# Patient Record
Sex: FEMALE | Race: WHITE | NOT HISPANIC OR LATINO | URBAN - METROPOLITAN AREA
[De-identification: names, ages, dates, MRNs, and addresses within clinical notes are randomized per-mention and may not be internally consistent; named-entity substitution may affect disease eponyms.]

---

## 2017-06-17 ENCOUNTER — APPOINTMENT (OUTPATIENT)
Dept: LAB | Facility: HOSPITAL | Age: 32
End: 2017-06-17
Payer: COMMERCIAL

## 2017-06-17 ENCOUNTER — OFFICE VISIT (OUTPATIENT)
Dept: URGENT CARE | Facility: CLINIC | Age: 32
End: 2017-06-17
Payer: COMMERCIAL

## 2017-06-17 DIAGNOSIS — N94.89 OTHER SPECIFIED CONDITIONS ASSOCIATED WITH FEMALE GENITAL ORGANS AND MENSTRUAL CYCLE: ICD-10-CM

## 2017-06-17 DIAGNOSIS — R30.9 PAINFUL MICTURITION: ICD-10-CM

## 2017-06-17 DIAGNOSIS — R10.2 PELVIC AND PERINEAL PAIN: ICD-10-CM

## 2017-06-17 DIAGNOSIS — N92.6 IRREGULAR MENSTRUATION: ICD-10-CM

## 2017-06-17 PROCEDURE — G0382 LEV 3 HOSP TYPE B ED VISIT: HCPCS

## 2017-06-17 PROCEDURE — 87086 URINE CULTURE/COLONY COUNT: CPT

## 2017-06-17 PROCEDURE — S9083 URGENT CARE CENTER GLOBAL: HCPCS

## 2017-06-19 LAB — BACTERIA UR CULT: NORMAL

## 2017-06-29 ENCOUNTER — TRANSCRIBE ORDERS (OUTPATIENT)
Dept: ADMINISTRATIVE | Facility: HOSPITAL | Age: 32
End: 2017-06-29

## 2017-06-29 ENCOUNTER — APPOINTMENT (OUTPATIENT)
Dept: LAB | Facility: HOSPITAL | Age: 32
End: 2017-06-29
Payer: COMMERCIAL

## 2017-06-29 ENCOUNTER — ALLSCRIPTS OFFICE VISIT (OUTPATIENT)
Dept: OTHER | Facility: OTHER | Age: 32
End: 2017-06-29

## 2017-06-29 ENCOUNTER — HOSPITAL ENCOUNTER (OUTPATIENT)
Dept: ULTRASOUND IMAGING | Facility: HOSPITAL | Age: 32
Discharge: HOME | End: 2017-06-29
Payer: COMMERCIAL

## 2017-06-29 DIAGNOSIS — R10.2 PELVIC AND PERINEAL PAIN: ICD-10-CM

## 2017-06-29 DIAGNOSIS — N94.89 OTHER SPECIFIED CONDITIONS ASSOCIATED WITH FEMALE GENITAL ORGANS AND MENSTRUAL CYCLE: ICD-10-CM

## 2017-06-29 DIAGNOSIS — N92.6 IRREGULAR MENSTRUATION: ICD-10-CM

## 2017-06-29 LAB
ALBUMIN SERPL BCP-MCNC: 4.1 G/DL (ref 3.5–5)
ALP SERPL-CCNC: 75 U/L (ref 46–116)
ALT SERPL W P-5'-P-CCNC: 24 U/L (ref 12–78)
ANION GAP SERPL CALCULATED.3IONS-SCNC: 9 MMOL/L (ref 4–13)
AST SERPL W P-5'-P-CCNC: 15 U/L (ref 5–45)
BASOPHILS # BLD AUTO: 0.04 THOUSANDS/ΜL (ref 0–0.1)
BASOPHILS NFR BLD AUTO: 1 % (ref 0–1)
BILIRUB SERPL-MCNC: 0.7 MG/DL (ref 0.2–1)
BUN SERPL-MCNC: 14 MG/DL (ref 5–25)
CALCIUM SERPL-MCNC: 9.5 MG/DL (ref 8.3–10.1)
CHLORIDE SERPL-SCNC: 104 MMOL/L (ref 100–108)
CO2 SERPL-SCNC: 28 MMOL/L (ref 21–32)
CREAT SERPL-MCNC: 0.89 MG/DL (ref 0.6–1.3)
EOSINOPHIL # BLD AUTO: 0.33 THOUSAND/ΜL (ref 0–0.61)
EOSINOPHIL NFR BLD AUTO: 4 % (ref 0–6)
ERYTHROCYTE [DISTWIDTH] IN BLOOD BY AUTOMATED COUNT: 12.4 % (ref 11.6–15.1)
GFR SERPL CREATININE-BSD FRML MDRD: >60 ML/MIN/1.73SQ M
GLUCOSE SERPL-MCNC: 87 MG/DL (ref 65–140)
HCG SERPL QL: NEGATIVE
HCT VFR BLD AUTO: 42.3 % (ref 34.8–46.1)
HGB BLD-MCNC: 14.3 G/DL (ref 11.5–15.4)
LYMPHOCYTES # BLD AUTO: 2.05 THOUSANDS/ΜL (ref 0.6–4.47)
LYMPHOCYTES NFR BLD AUTO: 26 % (ref 14–44)
MCH RBC QN AUTO: 31 PG (ref 26.8–34.3)
MCHC RBC AUTO-ENTMCNC: 33.8 G/DL (ref 31.4–37.4)
MCV RBC AUTO: 92 FL (ref 82–98)
MONOCYTES # BLD AUTO: 0.47 THOUSAND/ΜL (ref 0.17–1.22)
MONOCYTES NFR BLD AUTO: 6 % (ref 4–12)
NEUTROPHILS # BLD AUTO: 5.06 THOUSANDS/ΜL (ref 1.85–7.62)
NEUTS SEG NFR BLD AUTO: 63 % (ref 43–75)
PLATELET # BLD AUTO: 195 THOUSANDS/UL (ref 149–390)
PMV BLD AUTO: 11.4 FL (ref 8.9–12.7)
POTASSIUM SERPL-SCNC: 3.7 MMOL/L (ref 3.5–5.3)
PROT SERPL-MCNC: 8.1 G/DL (ref 6.4–8.2)
RBC # BLD AUTO: 4.62 MILLION/UL (ref 3.81–5.12)
SODIUM SERPL-SCNC: 141 MMOL/L (ref 136–145)
WBC # BLD AUTO: 7.95 THOUSAND/UL (ref 4.31–10.16)

## 2017-06-29 PROCEDURE — 76830 TRANSVAGINAL US NON-OB: CPT

## 2017-06-29 PROCEDURE — 80053 COMPREHEN METABOLIC PANEL: CPT

## 2017-06-29 PROCEDURE — 85025 COMPLETE CBC W/AUTO DIFF WBC: CPT

## 2017-06-29 PROCEDURE — 76856 US EXAM PELVIC COMPLETE: CPT

## 2017-06-29 PROCEDURE — 84703 CHORIONIC GONADOTROPIN ASSAY: CPT

## 2017-06-29 PROCEDURE — 36415 COLL VENOUS BLD VENIPUNCTURE: CPT

## 2017-07-01 ENCOUNTER — GENERIC CONVERSION - ENCOUNTER (OUTPATIENT)
Dept: OTHER | Facility: OTHER | Age: 32
End: 2017-07-01

## 2017-09-01 ENCOUNTER — GENERIC CONVERSION - ENCOUNTER (OUTPATIENT)
Dept: OTHER | Facility: OTHER | Age: 32
End: 2017-09-01

## 2017-09-28 ENCOUNTER — GENERIC CONVERSION - ENCOUNTER (OUTPATIENT)
Dept: OTHER | Facility: OTHER | Age: 32
End: 2017-09-28

## 2017-10-26 ENCOUNTER — GENERIC CONVERSION - ENCOUNTER (OUTPATIENT)
Dept: OTHER | Facility: OTHER | Age: 32
End: 2017-10-26

## 2017-11-21 ENCOUNTER — GENERIC CONVERSION - ENCOUNTER (OUTPATIENT)
Dept: OTHER | Facility: OTHER | Age: 32
End: 2017-11-21

## 2017-12-20 ENCOUNTER — GENERIC CONVERSION - ENCOUNTER (OUTPATIENT)
Dept: FAMILY MEDICINE CLINIC | Facility: CLINIC | Age: 32
End: 2017-12-20

## 2018-01-10 NOTE — MISCELLANEOUS
Message  I spoke with the patient and reviewed the results of her testing  Her ultrasound demonstrated some mild free fluid in the pelvis  Her lab work was all normal including her pregnancy test which was negative  I advised her it started likely some of her pain could be related to ruptured ovarian cyst  I advised her to follow-up with her gynecologist as scheduled  She will call with any worsening pain, fever, or change in symptoms  Results/Data     * US PELVIS COMPLETE (TRANSABDOMINAL AND TRANSVAGINAL)   US PELVIS COMPLETE (TRANSABDOMINAL AND TRANSVAGINAL): PELVIC  ULTRASOUND, COMPLETE     INDICATION: Pelvic and perineal pain  COMPARISON: None  TECHNIQUE:   Transabdominal pelvic ultrasound was performed in sagittal and  transverse planes with a curvilinear transducer  Additional transvaginal imaging was  performed to better evaluate the endometrium and ovaries  Imaging included  volumetric    sweeps as well as traditional still imaging technique  FINDINGS:     UTERUS:   The uterus is retroverted in position, measuring 8 2 x 5 3 x 5 3 cm  Contour and echotexture appear normal        The cervix shows no suspicious abnormality  ENDOMETRIUM:     Normal caliber of 9 mm  Homogenous and normal in appearance  OVARIES/ADNEXA:   Right ovary:  4 1 x 2 0 x 1 9 cm  No suspicious right ovarian abnormality  Doppler flow within normal limits  Left ovary:  3 3 x 1 4 x 1 2 cm  No suspicious left ovarian abnormality  Doppler flow within normal limits  No suspicious adnexal mass or loculated collections  Trace pelvic free fluid  IMPRESSION:       Trace pelvic free fluid  Otherwise, unremarkable pelvic ultrasound             Workstation performed: [id number]     Signed by:   Meenakshi 6, DO   6/29/17    (1) CBC/PLT/DIFF   WBC COUNT: 7 95 Thousand/uL Reference Range 4 31-10 16  RBC COUNT: 4 62 Million/uL Reference Range 3 81-5 12  HEMOGLOBIN: 14 3 g/dL Reference Range 11 5-15 4  HEMATOCRIT: 42 3 % Reference Range 34 8-46  1  MCV: 92 fL Reference Range 82-98  MCH: 31 0 pg Reference Range 26 8-34 3  MCHC: 33 8 g/dL Reference Range 31 4-37 4  RDW: 12 4 % Reference Range 11 6-15 1  MPV: 11 4 fL Reference Range 8 9-12 7  PLATELET COUNT: 555 Thousands/uL Reference Range 149-390  NEUTROPHILS RELATIVE PERCENT: 63 % Reference Range 43-75  LYMPHOCYTES RELATIVE PERCENT: 26 % Reference Range 14-44  MONOCYTES RELATIVE PERCENT: 6 % Reference Range 4-12  EOSINOPHILS RELATIVE PERCENT: 4 % Reference Range 0-6  BASOPHILS RELATIVE PERCENT: 1 % Reference Range 0-1  NEUTROPHILS ABSOLUTE COUNT: 5 06 Thousands/? ??L Reference Range 1 85-7 62  LYMPHOCYTES ABSOLUTE COUNT: 2 05 Thousands/? ??L Reference Range 0 60-4 47  MONOCYTES ABSOLUTE COUNT: 0 47 Thousand/? ??L Reference Range 0 17-1 22  EOSINOPHILS ABSOLUTE COUNT: 0 33 Thousand/? ??L Reference Range 0 00-0 61  BASOPHILS ABSOLUTE COUNT: 0 04 Thousands/? ??L Reference Range 0 00-0 10  (1) COMPREHENSIVE METABOLIC PANEL   SODIUM: 612 mmol/L Reference Range 136-145  POTASSIUM: 3 7 mmol/L Reference Range 3 5-5 3  CHLORIDE: 104 mmol/L Reference Range 100-108  CARBON DIOXIDE: 28 mmol/L Reference Range 21-32  ANION GAP (CALC): 9 mmol/L Reference Range 4-13  BLOOD UREA NITROGEN: 14 mg/dL Reference Range 5-25  CREATININE: 0 89 mg/dL Reference Range 0 60-1 30  GLUCOSE,RANDM: 87 mg/dL Reference Range   CALCIUM: 9 5 mg/dL Reference Range 8 3-10 1  AST(SGOT): 15 U/L Reference Range 5-45  ALT (SGPT): 24 U/L Reference Range 12-78  ALK PHOSPHATAS: 75 U/L Reference Range   TOTAL PROTEIN: 8 1 g/dL Reference Range 6 4-8 2  ALBUMIN: 4 1 g/dL Reference Range 3 5-5 0  BILI, TOTAL: 0 70 mg/dL Reference Range 0 20-1 00  eGFR Non-: >60 0 ml/min/1 73sq m  PREGNANCY TEST   PREGNANCY TEST: Negative  Reference Range Negative    Signatures   Electronically signed by : Mark Maria DO; Jul 1 2017 11:02AM EST (Author)

## 2018-01-13 VITALS
BODY MASS INDEX: 29.26 KG/M2 | WEIGHT: 159 LBS | SYSTOLIC BLOOD PRESSURE: 110 MMHG | HEART RATE: 71 BPM | DIASTOLIC BLOOD PRESSURE: 70 MMHG | RESPIRATION RATE: 17 BRPM | TEMPERATURE: 99.1 F | HEIGHT: 62 IN

## 2018-01-15 ENCOUNTER — GENERIC CONVERSION - ENCOUNTER (OUTPATIENT)
Dept: FAMILY MEDICINE CLINIC | Facility: CLINIC | Age: 33
End: 2018-01-15

## 2018-01-16 NOTE — MISCELLANEOUS
Provider Comments  Provider Comments:   [id number] NO SHOW 6 MONTH CK LETTER SENT VF      Signatures   Electronically signed by : Teddy Rangel DO; Mar  1 2016  8:21PM EST                       (Author)

## 2019-03-21 ENCOUNTER — OFFICE VISIT (OUTPATIENT)
Dept: FAMILY MEDICINE CLINIC | Facility: CLINIC | Age: 34
End: 2019-03-21
Payer: COMMERCIAL

## 2019-03-21 VITALS
OXYGEN SATURATION: 98 % | WEIGHT: 167.6 LBS | BODY MASS INDEX: 30.84 KG/M2 | HEIGHT: 62 IN | SYSTOLIC BLOOD PRESSURE: 108 MMHG | DIASTOLIC BLOOD PRESSURE: 86 MMHG | TEMPERATURE: 97 F | HEART RATE: 104 BPM

## 2019-03-21 DIAGNOSIS — L50.9 URTICARIAL RASH: Primary | ICD-10-CM

## 2019-03-21 PROCEDURE — 99213 OFFICE O/P EST LOW 20 MIN: CPT | Performed by: FAMILY MEDICINE

## 2019-03-21 PROCEDURE — 3008F BODY MASS INDEX DOCD: CPT | Performed by: FAMILY MEDICINE

## 2019-03-21 PROCEDURE — 1036F TOBACCO NON-USER: CPT | Performed by: FAMILY MEDICINE

## 2019-03-21 RX ORDER — METHYLPREDNISOLONE 4 MG/1
TABLET ORAL
Qty: 21 TABLET | Refills: 0 | Status: SHIPPED | OUTPATIENT
Start: 2019-03-21 | End: 2019-10-24 | Stop reason: ALTCHOICE

## 2019-03-21 NOTE — ASSESSMENT & PLAN NOTE
Patient is breastfeeding  Advised safe to take OTC allergy medication  We discussed the risk of taking benadryl or hydroxyzine but this is not recommended given she is breastfeeding  If symptoms don't totally resolve she will call back and we can send in extended course of prednisone

## 2019-03-21 NOTE — PROGRESS NOTES
Sj Guo 1985 female MRN: [id number]    Family Medicine Acute Visit    ASSESSMENT/PLAN  Problem List Items Addressed This Visit        Musculoskeletal and Integument    Urticarial rash - Primary     Patient is breastfeeding  Advised safe to take OTC allergy medication  We discussed the risk of taking benadryl or hydroxyzine but this is not recommended given she is breastfeeding  If symptoms don't totally resolve she will call back and we can send in extended course of prednisone         Relevant Medications    methylPREDNISolone 4 MG tablet therapy pack          RTC if worse or not improved  ED precautions discussed with patient       No future appointments  SUBJECTIVE  CC: Rash (forearms L/R )      HPI:  Sj Guo is a 26 y o  female who presents for rash  Started with rash last night, started on wrist and now is spreading  It is very itchy  Last year when pregnancy had PUPS and needed prednisone  No new products that she can think of  No household contacts have this  No trouble breathing, wheezing or throat tightness  Review of Systems   Constitutional: Negative for chills, fatigue and fever  HENT: Negative for congestion, postnasal drip, rhinorrhea and sinus pressure  Eyes: Negative for photophobia and visual disturbance  Respiratory: Negative for cough and shortness of breath  Cardiovascular: Negative for chest pain, palpitations and leg swelling  Gastrointestinal: Negative for abdominal pain, constipation, diarrhea, nausea and vomiting  Genitourinary: Negative for difficulty urinating and dysuria  Musculoskeletal: Negative for arthralgias and myalgias  Skin: Positive for rash  Negative for color change  Neurological: Negative for dizziness, weakness, light-headedness and headaches  Historical Information   The patient history was reviewed as follows:  History reviewed  No pertinent past medical history  History reviewed   No pertinent surgical history  History reviewed  No pertinent family history  Social History   Social History     Substance and Sexual Activity   Alcohol Use Never    Frequency: Never     Social History     Substance and Sexual Activity   Drug Use Never     Social History     Tobacco Use   Smoking Status Never Smoker   Smokeless Tobacco Never Used       Medications:     Current Outpatient Medications:     methylPREDNISolone 4 MG tablet therapy pack, Use as directed on package, Disp: 21 tablet, Rfl: 0    No Known Allergies    OBJECTIVE  Vitals:   Vitals:    03/21/19 1437   BP: 108/86   BP Location: Left arm   Patient Position: Sitting   Cuff Size: Standard   Pulse: 104   Temp: (!) 97 °F (36 1 °C)   TempSrc: Tympanic   SpO2: 98%   Weight: 76 kg (167 lb 9 6 oz)   Height: 5' 2" (1 575 m)         Physical Exam   Constitutional: She is oriented to person, place, and time  She appears well-developed and well-nourished  HENT:   Head: Normocephalic and atraumatic  Mouth/Throat: Oropharynx is clear and moist    Eyes: Pupils are equal, round, and reactive to light  Neck: Normal range of motion  Neck supple  Cardiovascular: Normal rate, regular rhythm and normal heart sounds  Pulmonary/Chest: Effort normal and breath sounds normal  No respiratory distress  She has no wheezes  Abdominal: Soft  Bowel sounds are normal  She exhibits no distension  There is no tenderness  Musculoskeletal: Normal range of motion  She exhibits no edema or tenderness  Neurological: She is alert and oriented to person, place, and time  Skin: Skin is warm and dry  Rash noted  Rash is urticarial    Urticarial rash present on b/l arms and wrists    Psychiatric: She has a normal mood and affect   Her behavior is normal                   Antoine Gongora, DO    3/21/2019

## 2019-10-24 ENCOUNTER — OFFICE VISIT (OUTPATIENT)
Dept: FAMILY MEDICINE CLINIC | Facility: CLINIC | Age: 34
End: 2019-10-24
Payer: COMMERCIAL

## 2019-10-24 VITALS
DIASTOLIC BLOOD PRESSURE: 80 MMHG | TEMPERATURE: 98.5 F | BODY MASS INDEX: 30.73 KG/M2 | HEIGHT: 62 IN | WEIGHT: 167 LBS | RESPIRATION RATE: 12 BRPM | HEART RATE: 84 BPM | SYSTOLIC BLOOD PRESSURE: 124 MMHG

## 2019-10-24 DIAGNOSIS — M77.9 TENDONITIS: Primary | ICD-10-CM

## 2019-10-24 PROCEDURE — 99213 OFFICE O/P EST LOW 20 MIN: CPT | Performed by: NURSE PRACTITIONER

## 2019-10-24 PROCEDURE — 3008F BODY MASS INDEX DOCD: CPT | Performed by: NURSE PRACTITIONER

## 2019-10-24 RX ORDER — PREDNISONE 10 MG/1
10 TABLET ORAL DAILY
Qty: 24 TABLET | Refills: 0 | Status: SHIPPED | OUTPATIENT
Start: 2019-10-24 | End: 2020-02-05 | Stop reason: ALTCHOICE

## 2019-10-24 NOTE — PATIENT INSTRUCTIONS
Tendinitis   WHAT YOU NEED TO KNOW:   Tendinitis is painful inflammation or breakdown of your tendons  It may also be called tendinopathy  Tendinitis often occurs in the knee, shoulder, ankle, hip, or elbow  DISCHARGE INSTRUCTIONS:   Medicines:   · Pain medicines  such as acetaminophen or NSAIDs may decrease swelling and pain or fever  These medicines are available without a doctor's order  Ask which medicine to take  Ask how much to take and when to take it  Follow directions  Acetaminophen and NSAIDs can cause liver or kidney damage if not taken correctly  If you take blood thinner medicine, always ask your healthcare provider if NSAIDs are safe for you  Always read the medicine label and follow the directions on it before using these medicine  · Take your medicine as directed  Contact your healthcare provider if you think your medicine is not helping or if you have side effects  Tell him if you are allergic to any medicine  Keep a list of the medicines, vitamins, and herbs you take  Include the amounts, and when and why you take them  Bring the list or the pill bottles to follow-up visits  Carry your medicine list with you in case of an emergency  Management:   · Rest  your tendon as directed to help it heal  Ask your healthcare provider if you need to stop putting weight on your affected area  · Ice  helps decrease swelling and pain  Ice may also help prevent tissue damage  Use an ice pack, or put crushed ice in a plastic bag  Cover it with a towel and place it on the affected area for 10 to 15 minutes every hour or as directed  · Support devices  such as a cane, splint, shoe insert, or brace may help reduce your pain  · Physical therapy  may be ordered by your healthcare provider  This may be used to teach you exercises to help improve movement and strength, and to decrease pain  You may also learn how to improve your posture, and how to lift or exercise correctly    Prevention:   · Stretch and warm up  before you exercise  · Exercise regularly  to strengthen the muscles around your joint  Ease into an exercise routine for the first 3 weeks to prevent another injury  Ask your healthcare provider about the best exercise plan for you  Rest fully between activities  · Use the right equipment  for sports and exercise  Wear braces or tape around weak joints as directed  Follow up with your healthcare provider as directed:  Write down your questions so you remember to ask them during your visits  Contact your healthcare provider if:   · You have increased pain even after you take medicine  · You have questions or concerns about your condition or care  Return to the emergency department if:   · You have increased redness over the joint, or swelling in the joint  · You suddenly cannot move your joint  © 2017 [employer]  Information is for End User's use only and may not be sold, redistributed or otherwise used for commercial purposes  All illustrations and images included in CareNotes® are the copyrighted property of [employer]  or [employer]  The above information is an  only  It is not intended as medical advice for individual conditions or treatments  Talk to your doctor, nurse or pharmacist before following any medical regimen to see if it is safe and effective for you

## 2019-10-24 NOTE — PROGRESS NOTES
Assessment/Plan   Diagnoses and all orders for this visit:    Tendonitis  -     predniSONE 10 mg tablet; Take 1 tablet (10 mg total) by mouth daily 5 tabs 10/24 & 10/25, 4 tabs 10/26 & 10/27, 3 tabs 10/28, 2 tabs 10/29, 1 tab 10/30  -     Ambulatory referral to Orthopedic Surgery; Future        Chief Complaint   Patient presents with    Wrist Pain     woke up with it 2-3 days ago       Subjective   Patient ID: Marialuisa Alex is a 37 y o  female  Vitals:    10/24/19 1506   BP: 124/80   Pulse: 84   Resp: 12   Temp: 98 5 °F (36 9 °C)     Right hand    Hand Pain    The incident occurred 3 to 5 days ago (woke with pain in right wrist 3 days ago, , repetive motion with keyboard, right hand dominant)  The incident occurred at home  There was no injury mechanism  The pain is present in the right wrist  The quality of the pain is described as aching and burning  The pain does not radiate  The pain is at a severity of 5/10  The pain is moderate  The pain has been constant since the incident  Pertinent negatives include no chest pain, muscle weakness, numbness or tingling  The symptoms are aggravated by movement  She has tried NSAIDs for the symptoms  The treatment provided no relief  The following portions of the patient's history were reviewed and updated as appropriate: allergies, current medications, past family history, past social history, past surgical history and problem list     Review of Systems   Constitutional: Negative  HENT: Negative  Eyes: Negative  Respiratory: Negative  Cardiovascular: Negative  Negative for chest pain  Gastrointestinal: Negative  Endocrine: Negative  Genitourinary: Negative  Musculoskeletal: Positive for arthralgias  Skin: Negative  Allergic/Immunologic: Negative  Neurological: Negative  Negative for tingling and numbness  Hematological: Negative  Psychiatric/Behavioral: Negative          Objective     Physical Exam Constitutional: She is oriented to person, place, and time  She appears well-developed and well-nourished  No distress  HENT:   Head: Normocephalic and atraumatic  Eyes: Conjunctivae are normal  Right eye exhibits no discharge  Left eye exhibits no discharge  No scleral icterus  Neck: Normal range of motion  Neck supple  No thyromegaly present  Cardiovascular: Normal rate, regular rhythm and normal heart sounds  No murmur heard  Pulmonary/Chest: Effort normal and breath sounds normal  No respiratory distress  She has no wheezes  Abdominal: Soft  Bowel sounds are normal    Musculoskeletal: Normal range of motion  She exhibits tenderness  She exhibits no edema  Right wrist: She exhibits tenderness  She exhibits normal range of motion, no effusion, no crepitus and no deformity  Arms:  Lymphadenopathy:     She has no cervical adenopathy  Neurological: She is alert and oriented to person, place, and time  Skin: Skin is warm and dry  Capillary refill takes less than 2 seconds  She is not diaphoretic  Psychiatric: She has a normal mood and affect  Her behavior is normal  Judgment and thought content normal    Nursing note and vitals reviewed    No Known Allergies  Patient Active Problem List   Diagnosis    Urticarial rash       Current Outpatient Medications:     predniSONE 10 mg tablet, Take 1 tablet (10 mg total) by mouth daily 5 tabs 10/24 & 10/25, 4 tabs 10/26 & 10/27, 3 tabs 10/28, 2 tabs 10/29, 1 tab 10/30, Disp: 24 tablet, Rfl: 0  Social History     Socioeconomic History    Marital status: /Civil Union     Spouse name: Not on file    Number of children: Not on file    Years of education: Not on file    Highest education level: Not on file   Occupational History    Not on file   Social Needs    Financial resource strain: Not on file    Food insecurity:     Worry: Not on file     Inability: Not on file    Transportation needs:     Medical: Not on file     Non-medical: Not on file   Tobacco Use    Smoking status: Never Smoker    Smokeless tobacco: Never Used   Substance and Sexual Activity    Alcohol use: Never     Frequency: Never    Drug use: Never    Sexual activity: Not Currently     Partners: Male     Birth control/protection: None   Lifestyle    Physical activity:     Days per week: Not on file     Minutes per session: Not on file    Stress: Not on file   Relationships    Social connections:     Talks on phone: Not on file     Gets together: Not on file     Attends Judaism service: Not on file     Active member of club or organization: Not on file     Attends meetings of clubs or organizations: Not on file     Relationship status: Not on file    Intimate partner violence:     Fear of current or ex partner: Not on file     Emotionally abused: Not on file     Physically abused: Not on file     Forced sexual activity: Not on file   Other Topics Concern    Not on file   Social History Narrative    Not on file     No family history on file

## 2020-02-05 ENCOUNTER — OFFICE VISIT (OUTPATIENT)
Dept: URGENT CARE | Facility: CLINIC | Age: 35
End: 2020-02-05
Payer: COMMERCIAL

## 2020-02-05 VITALS
WEIGHT: 173 LBS | SYSTOLIC BLOOD PRESSURE: 110 MMHG | RESPIRATION RATE: 18 BRPM | TEMPERATURE: 98.7 F | DIASTOLIC BLOOD PRESSURE: 72 MMHG | HEIGHT: 63 IN | HEART RATE: 103 BPM | BODY MASS INDEX: 30.65 KG/M2 | OXYGEN SATURATION: 98 %

## 2020-02-05 DIAGNOSIS — J11.1 INFLUENZA-LIKE ILLNESS: Primary | ICD-10-CM

## 2020-02-05 LAB — S PYO AG THROAT QL: NEGATIVE

## 2020-02-05 PROCEDURE — 87880 STREP A ASSAY W/OPTIC: CPT | Performed by: PHYSICIAN ASSISTANT

## 2020-02-05 PROCEDURE — 99213 OFFICE O/P EST LOW 20 MIN: CPT | Performed by: PHYSICIAN ASSISTANT

## 2020-02-05 NOTE — PROGRESS NOTES
[facility]        NAME: Leyla Prado is a 37 y o  female  : 1985    MRN: [id number]  DATE: 2020  TIME: 1:48 PM    Assessment and Plan   Influenza-like illness [R69]  1  Influenza-like illness  POCT rapid strepA         Patient Instructions     Encourage lots of rest and fluids  Continue w/ OTC symptom relief including tylenol, cough medicine, etc   Discussed etiology is most likely viral, possible flu  Follow up with PCP in 3-5 days  Proceed to  ER if symptoms worsen  Chief Complaint     Chief Complaint   Patient presents with    Sore Throat     Pt c/o body aches, lethargy and sore throat x 3 days  Denies fevers  History of Present Illness       Patient presents with complaint of sore throat x 3 days  Pt reports associated cough, intermittent congestion & rhinorrhea, fatigue, body aches, nausea, and myalgias  She denies fever, chills, night sweats, chest pain, dyspnea, headache, vomiting, and diarrhea  Pt reports several recent sick contacts including her children  Pt states that one of her daughters was diagnosed with strep pharyngitis this AM  She reports taking a cold & flu medication last night but denies trying other palliative measures  She reports getting a flu shot this year  Review of Systems   Review of Systems   Constitutional: Positive for fatigue  Negative for chills and fever  HENT: Positive for rhinorrhea and sore throat  Respiratory: Positive for cough  Negative for chest tightness, shortness of breath and wheezing  Cardiovascular: Negative for chest pain and palpitations  Gastrointestinal: Positive for nausea  Musculoskeletal: Positive for myalgias  Skin: Negative for color change, rash and wound  Neurological: Negative for headaches  All other systems reviewed and are negative  Current Medications     No current outpatient medications on file      Current Allergies     Allergies as of 2020    (No Known Allergies) The following portions of the patient's history were reviewed and updated as appropriate: allergies, current medications, past family history, past medical history, past social history, past surgical history and problem list      Past Medical History:   Diagnosis Date    Known health problems: none        Past Surgical History:   Procedure Laterality Date    ADENOIDECTOMY      TONSILLECTOMY         Family History   Problem Relation Age of Onset    Diabetes Mother     No Known Problems Father          Medications have been verified  Objective   /72   Pulse 103   Temp 98 7 °F (37 1 °C)   Resp 18   Ht 5' 3" (1 6 m)   Wt 78 5 kg (173 lb)   SpO2 98%   BMI 30 65 kg/m²        Physical Exam     Physical Exam   Constitutional: She is oriented to person, place, and time  She appears well-developed and well-nourished  Non-toxic appearance  She does not appear ill  No distress  HENT:   Head: Normocephalic and atraumatic  Right Ear: Tympanic membrane and ear canal normal  No drainage or swelling  Left Ear: Tympanic membrane and ear canal normal  No drainage or swelling  Mouth/Throat: Mucous membranes are normal  No oropharyngeal exudate, posterior oropharyngeal edema or posterior oropharyngeal erythema  Tonsils are 0 on the right  Tonsils are 0 on the left  No tonsillar exudate  Eyes: Pupils are equal, round, and reactive to light  Conjunctivae and EOM are normal    Neck: Normal range of motion  Neck supple  Cardiovascular: Normal rate, regular rhythm and normal heart sounds  Pulmonary/Chest: Effort normal and breath sounds normal  No stridor  No respiratory distress  She has no wheezes  She has no rhonchi  She has no rales  Lymphadenopathy:     She has no cervical adenopathy  Neurological: She is alert and oriented to person, place, and time  No cranial nerve deficit or sensory deficit  Skin: Skin is warm and dry  Capillary refill takes less than 2 seconds  No rash noted  She is not diaphoretic  Psychiatric: She has a normal mood and affect  Her behavior is normal  Thought content normal    Nursing note and vitals reviewed

## 2020-02-12 ENCOUNTER — OFFICE VISIT (OUTPATIENT)
Dept: URGENT CARE | Facility: CLINIC | Age: 35
End: 2020-02-12
Payer: COMMERCIAL

## 2020-02-12 VITALS
WEIGHT: 173 LBS | HEIGHT: 62 IN | BODY MASS INDEX: 31.83 KG/M2 | TEMPERATURE: 97.4 F | HEART RATE: 99 BPM | SYSTOLIC BLOOD PRESSURE: 134 MMHG | RESPIRATION RATE: 16 BRPM | DIASTOLIC BLOOD PRESSURE: 79 MMHG | OXYGEN SATURATION: 98 %

## 2020-02-12 DIAGNOSIS — H10.9 CONJUNCTIVITIS OF BOTH EYES, UNSPECIFIED CONJUNCTIVITIS TYPE: Primary | ICD-10-CM

## 2020-02-12 PROCEDURE — S9083 URGENT CARE CENTER GLOBAL: HCPCS | Performed by: NURSE PRACTITIONER

## 2020-02-12 PROCEDURE — G0382 LEV 3 HOSP TYPE B ED VISIT: HCPCS | Performed by: NURSE PRACTITIONER

## 2020-02-12 RX ORDER — GENTAMICIN SULFATE 3 MG/ML
1 SOLUTION/ DROPS OPHTHALMIC EVERY 4 HOURS
Qty: 5 ML | Refills: 0 | Status: SHIPPED | OUTPATIENT
Start: 2020-02-12

## 2020-02-12 NOTE — PATIENT INSTRUCTIONS

## 2020-02-12 NOTE — PROGRESS NOTES
[facility]        NAME: Jefferson Osborn is a 37 y o  female  : 1985    MRN: [id number]  DATE: 2020  TIME: 4:17 PM    Assessment and Plan   Conjunctivitis of both eyes, unspecified conjunctivitis type [H10 9]  1  Conjunctivitis of both eyes, unspecified conjunctivitis type  gentamicin (GARAMYCIN) 0 3 % ophthalmic solution         Patient Instructions     Gentamicin drops sent to pharmacy  Follow up with PCP in 3-5 days  Proceed to  ER if symptoms worsen  Chief Complaint     Chief Complaint   Patient presents with    Conjunctivitis     Pt reports yesterday she started with c/o b/l eye redness, itching and drainage  History of Present Illness       HPI   Patient present with bilateral eye redness, itching, and drainage which began this am  She reports that when she woke up this morning her L eye had crusty dry yellow drainage  Both of her daughters have been diagnosed with pink eye  She denies any visual changes or pain  Review of Systems   Review of Systems   Eyes: Positive for discharge, redness and itching  Negative for photophobia, pain and visual disturbance           Current Medications       Current Outpatient Medications:     gentamicin (GARAMYCIN) 0 3 % ophthalmic solution, Administer 1 drop to both eyes every 4 (four) hours 5 days, Disp: 5 mL, Rfl: 0    Current Allergies     Allergies as of 2020    (No Known Allergies)            The following portions of the patient's history were reviewed and updated as appropriate: allergies, current medications, past family history, past medical history, past social history, past surgical history and problem list      Past Medical History:   Diagnosis Date    Known health problems: none        Past Surgical History:   Procedure Laterality Date    ADENOIDECTOMY       SECTION      x2    TONSILLECTOMY         Family History   Problem Relation Age of Onset    Diabetes Mother     No Known Problems Father Medications have been verified  Objective   /79 (BP Location: Left arm, Patient Position: Sitting)   Pulse 99   Temp (!) 97 4 °F (36 3 °C) (Tympanic)   Resp 16   Ht 5' 2" (1 575 m)   Wt 78 5 kg (173 lb)   SpO2 98%   BMI 31 64 kg/m²        Physical Exam     Physical Exam   Eyes: EOM are normal  Right eye exhibits no discharge  Left eye exhibits no discharge  Right conjunctiva is injected  Left conjunctiva is injected

## 2021-03-05 ENCOUNTER — VBI (OUTPATIENT)
Dept: ADMINISTRATIVE | Facility: OTHER | Age: 36
End: 2021-03-05

## 2021-04-01 DIAGNOSIS — Z23 ENCOUNTER FOR IMMUNIZATION: ICD-10-CM

## 2021-04-11 ENCOUNTER — IMMUNIZATIONS (OUTPATIENT)
Dept: FAMILY MEDICINE CLINIC | Facility: HOSPITAL | Age: 36
End: 2021-04-11

## 2021-04-11 DIAGNOSIS — Z23 ENCOUNTER FOR IMMUNIZATION: Primary | ICD-10-CM

## 2021-04-11 PROCEDURE — 0001A SARS-COV-2 / COVID-19 MRNA VACCINE (PFIZER-BIONTECH) 30 MCG: CPT

## 2021-04-11 PROCEDURE — 91300 SARS-COV-2 / COVID-19 MRNA VACCINE (PFIZER-BIONTECH) 30 MCG: CPT

## 2021-05-04 ENCOUNTER — IMMUNIZATIONS (OUTPATIENT)
Dept: FAMILY MEDICINE CLINIC | Facility: HOSPITAL | Age: 36
End: 2021-05-04

## 2021-05-04 DIAGNOSIS — Z23 ENCOUNTER FOR IMMUNIZATION: Primary | ICD-10-CM

## 2021-05-04 PROCEDURE — 91300 SARS-COV-2 / COVID-19 MRNA VACCINE (PFIZER-BIONTECH) 30 MCG: CPT

## 2021-05-04 PROCEDURE — 0002A SARS-COV-2 / COVID-19 MRNA VACCINE (PFIZER-BIONTECH) 30 MCG: CPT

## 2022-12-30 ENCOUNTER — OFFICE VISIT (OUTPATIENT)
Dept: URGENT CARE | Facility: CLINIC | Age: 37
End: 2022-12-30

## 2022-12-30 VITALS
HEART RATE: 97 BPM | BODY MASS INDEX: 29.44 KG/M2 | RESPIRATION RATE: 18 BRPM | WEIGHT: 160 LBS | TEMPERATURE: 98.9 F | HEIGHT: 62 IN | OXYGEN SATURATION: 98 %

## 2022-12-30 DIAGNOSIS — J01.00 ACUTE NON-RECURRENT MAXILLARY SINUSITIS: Primary | ICD-10-CM

## 2022-12-30 RX ORDER — PSEUDOEPHEDRINE HCL 120 MG/1
120 TABLET, FILM COATED, EXTENDED RELEASE ORAL EVERY 12 HOURS
Qty: 14 TABLET | Refills: 0 | Status: SHIPPED | OUTPATIENT
Start: 2022-12-30 | End: 2023-01-06

## 2022-12-30 RX ORDER — FLUTICASONE PROPIONATE 50 MCG
1 SPRAY, SUSPENSION (ML) NASAL DAILY
Qty: 11.1 ML | Refills: 0 | Status: SHIPPED | OUTPATIENT
Start: 2022-12-30

## 2022-12-30 RX ORDER — AZITHROMYCIN 250 MG/1
TABLET, FILM COATED ORAL
Qty: 6 TABLET | Refills: 0 | Status: SHIPPED | OUTPATIENT
Start: 2022-12-30 | End: 2023-01-03

## 2022-12-30 NOTE — PROGRESS NOTES
[facility] Now        NAME: Nile Corey is a 42 y o  female  : 1985    MRN: [id number]  DATE: 2022  TIME: 1:39 PM    Assessment and Plan   Acute non-recurrent maxillary sinusitis [J01 00]  1  Acute non-recurrent maxillary sinusitis  fluticasone (FLONASE) 50 mcg/act nasal spray    pseudoephedrine (SUDAFED) 120 MG 12 hr tablet    azithromycin (ZITHROMAX) 250 mg tablet        Patient presents with c/o ongoing sinus congestion and new left sided maxillary pain  Was diagnosed with COVID on  however started symptoms several days prior  Overall congestion , cough and symptoms have improved however localized area of pain is worsening  Has tried OTC medications and decongestants  Assessment notes left max sinus pain and TTP  BS clear  Patient Instructions       Follow up with PCP as needed  Chief Complaint     Chief Complaint   Patient presents with   • Cold Like Symptoms     Pt reports testing Covid positive on  with symptom onset a couple of days prior to testing  She presents today with sinus pressure, with eye and tooth pain she states symptoms started a couple of days ago  Managing symptoms with sudafed cold and flu, saline spray, and tylenol with some symptom relief  History of Present Illness       Patient presents with c/o ongoing sinus congestion and new left sided maxillary pain  Was diagnosed with COVID on  however started symptoms several days prior  Overall congestion , cough and symptoms have improved however localized area of pain is worsening  Has tried OTC medications and decongestants  Assessment notes left max sinus pain and TTP  BS clear  Review of Systems   Review of Systems   Constitutional: Negative for chills and fever  HENT: Positive for congestion, sinus pressure and sinus pain  Negative for ear pain, postnasal drip and sore throat  Eyes: Negative for pain and itching     Respiratory: Negative for cough, shortness of breath and wheezing  Cardiovascular: Negative for chest pain and palpitations  Gastrointestinal: Negative for abdominal pain, constipation, diarrhea, nausea and vomiting  Genitourinary: Negative for difficulty urinating and hematuria  Musculoskeletal: Negative for arthralgias and myalgias  Skin: Negative for rash  Neurological: Negative for dizziness, light-headedness and headaches  Psychiatric/Behavioral: Negative for agitation and sleep disturbance  The patient is not nervous/anxious  Current Medications       Current Outpatient Medications:   •  azithromycin (ZITHROMAX) 250 mg tablet, Take 2 tablets today then 1 tablet daily x 4 days, Disp: 6 tablet, Rfl: 0  •  fluticasone (FLONASE) 50 mcg/act nasal spray, 1 spray into each nostril daily, Disp: 11 1 mL, Rfl: 0  •  pseudoephedrine (SUDAFED) 120 MG 12 hr tablet, Take 1 tablet (120 mg total) by mouth every 12 (twelve) hours for 7 days, Disp: 14 tablet, Rfl: 0  •  gentamicin (GARAMYCIN) 0 3 % ophthalmic solution, Administer 1 drop to both eyes every 4 (four) hours 5 days (Patient not taking: Reported on 2022), Disp: 5 mL, Rfl: 0    Current Allergies     Allergies as of 2022   • (No Known Allergies)            The following portions of the patient's history were reviewed and updated as appropriate: allergies, current medications, past family history, past medical history, past social history, past surgical history and problem list      Past Medical History:   Diagnosis Date   • Known health problems: none        Past Surgical History:   Procedure Laterality Date   • ADENOIDECTOMY     •  SECTION      x2   • TONSILLECTOMY         Family History   Problem Relation Age of Onset   • Diabetes Mother    • No Known Problems Father          Medications have been verified  Objective   Pulse 97   Temp 98 9 °F (37 2 °C)   Resp 18   Ht 5' 2" (1 575 m)   Wt 72 6 kg (160 lb)   SpO2 98%   BMI 29 26 kg/m²   No LMP recorded  Physical Exam     Physical Exam  Vitals reviewed  Constitutional:       General: She is not in acute distress  Appearance: Normal appearance  She is not ill-appearing  HENT:      Head: Normocephalic and atraumatic  Nose: Congestion present  Left Sinus: Maxillary sinus tenderness present  Eyes:      Extraocular Movements: Extraocular movements intact  Conjunctiva/sclera: Conjunctivae normal    Pulmonary:      Effort: Pulmonary effort is normal    Lymphadenopathy:      Cervical: Cervical adenopathy present  Skin:     General: Skin is warm  Neurological:      General: No focal deficit present  Mental Status: She is alert     Psychiatric:         Mood and Affect: Mood normal          Behavior: Behavior normal          Judgment: Judgment normal

## 2023-01-30 ENCOUNTER — OFFICE VISIT (OUTPATIENT)
Dept: FAMILY MEDICINE CLINIC | Facility: HOSPITAL | Age: 38
End: 2023-01-30

## 2023-01-30 VITALS
BODY MASS INDEX: 31.65 KG/M2 | HEART RATE: 80 BPM | SYSTOLIC BLOOD PRESSURE: 110 MMHG | HEIGHT: 62 IN | WEIGHT: 172 LBS | OXYGEN SATURATION: 98 % | DIASTOLIC BLOOD PRESSURE: 84 MMHG

## 2023-01-30 DIAGNOSIS — F41.1 GAD (GENERALIZED ANXIETY DISORDER): ICD-10-CM

## 2023-01-30 DIAGNOSIS — Z13.0 SCREENING FOR IRON DEFICIENCY ANEMIA: ICD-10-CM

## 2023-01-30 DIAGNOSIS — Z13.1 SCREENING FOR DIABETES MELLITUS: Primary | ICD-10-CM

## 2023-01-30 DIAGNOSIS — Z11.59 NEED FOR HEPATITIS C SCREENING TEST: ICD-10-CM

## 2023-01-30 DIAGNOSIS — Z13.220 SCREENING FOR HYPERLIPIDEMIA: ICD-10-CM

## 2023-01-30 DIAGNOSIS — Z13.29 SCREENING FOR THYROID DISORDER: ICD-10-CM

## 2023-01-30 DIAGNOSIS — R22.42 SKIN LUMP OF LEG, LEFT: ICD-10-CM

## 2023-01-30 DIAGNOSIS — E07.9 SWELLING OF THYROID GLAND: ICD-10-CM

## 2023-01-30 RX ORDER — ESCITALOPRAM OXALATE 10 MG/1
5 TABLET, FILM COATED ORAL DAILY
Qty: 90 TABLET | Refills: 1 | Status: SHIPPED | OUTPATIENT
Start: 2023-01-30

## 2023-01-30 NOTE — PATIENT INSTRUCTIONS
[facility]  [facility] - [address], [facility] [address] Psychology: [phone]    Call insurance for in [facility], [address] or not  Better Help or Therapists  com     Dr Charla Moss

## 2023-01-30 NOTE — PROGRESS NOTES
[facility],     Assessment/Plan:      Diagnosis ICD-10-CM Associated Orders   1  Screening for diabetes mellitus  Z13 1 Comprehensive metabolic panel      2  Screening for iron deficiency anemia  Z13 0 CBC and differential      3  Screening for thyroid disorder  Z13 29 TSH, 3rd generation with Free T4 reflex      4  Need for hepatitis C screening test  Z11 59 Hepatitis C antibody      5  Screening for hyperlipidemia  Z13 220 Lipid panel      6  ABRAHAN (generalized anxiety disorder)  F41 1 Ambulatory Referral to Psychiatry     Lexapro 10 MG tablet      7  Skin lump of leg, left  R22 42 US extremity soft tissue      8  Swelling of thyroid gland  E07 9 US thyroid        • Retry lexapro low dose then call if not getting better  • US x2 as above  Screening & diagnostic bloodwork ordered, our office will reach out with results once obtained  • Referral infor given  Return in about 3 months (around 4/30/2023) for Annual Physical   • Patient may call or return to office with any questions or concerns  ______________________________________________________________________  Subjective:     Patient ID: Desiree Hill is a 42 y o  female  HPI  Desiree Hill  Chief Complaint   Patient presents with   • Physical Exam   • Establish Care     2 months ago noticed left thigh lump  No hx of Abnormal paps  2 daughters through [facility] - Dr Raul Sofia   2 C/S's  No contraception, possible future babies  Swelling on R throat compared to left  C/O thyroid, no family hx  Has noticed weak core since baby #2, worse exercising  On lexapro x2, prior to pregnancies  Stopped for pregnancies  Has done some therapy in the past, not recently  More life stressors  Daughter has emotional/behavioral concerns  BMI Counseling: Body mass index is 31 46 kg/m²  The BMI is above normal  Nutrition recommendations include decreasing portion sizes and encouraging healthy choices of fruits and vegetables  Exercise recommendations include moderate physical activity 150 minutes/week, exercising 3-5 times per week and strength training exercises  Rationale for BMI follow-up plan is due to patient being overweight or obese  Depression Screening and Follow-up Plan: Patient was screened for depression during today's encounter  They screened negative with a PHQ-2 score of 2  No SI or HI      ABRAHAN-7 Flowsheet Screening    Flowsheet Row Most Recent Value   Over the last 2 weeks, how often have you been bothered by any of the following problems? Feeling nervous, anxious, or on edge 2   Not being able to stop or control worrying 2   Worrying too much about different things 2   Trouble relaxing 1   Being so restless that it is hard to sit still 0   Becoming easily annoyed or irritable 2   Feeling afraid as if something awful might happen 0   ABRAHAN-7 Total Score 9        The following portions of the patient's history were reviewed and updated as appropriate: allergies, current medications, past medical history, and problem list     Review of Systems   Constitutional: Negative for chills and fever  Respiratory: Negative for cough and shortness of breath  Cardiovascular: Negative for chest pain and palpitations  Neurological: Negative for dizziness, light-headedness and headaches  Psychiatric/Behavioral: Positive for dysphoric mood and sleep disturbance (due to kids)  Negative for self-injury and suicidal ideas  The patient is nervous/anxious  Objective:      Vitals:    01/30/23 1506   BP: 110/84   Pulse: 80   SpO2: 98%      Physical Exam  Vitals reviewed  Constitutional:       General: She is not in acute distress  Appearance: Normal appearance  She is well-developed  She is not ill-appearing  HENT:      Head: Normocephalic and atraumatic  Eyes:      General: No scleral icterus  Right eye: No discharge  Left eye: No discharge     Neck:      Comments: Soft tissue mass R thyroid larger than left   Cardiovascular:      Rate and Rhythm: Normal rate and regular rhythm  Pulses: Normal pulses  Heart sounds: Normal heart sounds  No murmur heard  Pulmonary:      Effort: Pulmonary effort is normal  No respiratory distress  Breath sounds: Normal breath sounds  No stridor  No wheezing  Musculoskeletal:      Cervical back: Normal range of motion and neck supple  No rigidity or tenderness  Right lower leg: No edema  Left lower leg: No edema  Lymphadenopathy:      Cervical: No cervical adenopathy  Skin:     General: Skin is warm  Findings: Lesion (small pea sized lump on posterior left thigh, just below gluteal fold) present  No erythema or rash  Neurological:      Mental Status: She is alert and oriented to person, place, and time  Gait: Gait normal    Psychiatric:         Behavior: Behavior normal          Thought Content: Thought content normal          Judgment: Judgment normal       Comments: Anxious, borderline tearful, reserved            Portions of the record may have been created with voice recognition software  Occasional wrong word or "sound alike" substitutions may have occurred due to the inherent limitations of voice recognition software  Please review the chart carefully and recognize, using context, where substitutions/typographical errors may have occurred

## 2023-02-06 ENCOUNTER — HOSPITAL ENCOUNTER (OUTPATIENT)
Dept: ULTRASOUND IMAGING | Facility: HOSPITAL | Age: 38
Discharge: HOME | End: 2023-02-06
Attending: STUDENT IN AN ORGANIZED HEALTH CARE EDUCATION/TRAINING PROGRAM

## 2023-02-06 DIAGNOSIS — E07.9 SWELLING OF THYROID GLAND: ICD-10-CM

## 2023-02-06 DIAGNOSIS — R22.42 SKIN LUMP OF LEG, LEFT: ICD-10-CM

## 2023-03-21 ENCOUNTER — RA CDI HCC (OUTPATIENT)
Dept: OTHER | Facility: HOSPITAL | Age: 38
End: 2023-03-21

## 2023-03-21 NOTE — PROGRESS NOTES
[facility]  coding opportunities       Chart reviewed, no opportunity found: CHART REVIEWED, NO OPPORTUNITY FOUND        Patients Insurance        Commercial Insurance: [address] [place] [address]

## 2023-03-29 LAB
ALBUMIN SERPL-MCNC: 4.4 G/DL (ref 3.6–5.1)
ALBUMIN/GLOB SERPL: 1.8 (CALC) (ref 1–2.5)
ALP SERPL-CCNC: 70 U/L (ref 31–125)
ALT SERPL-CCNC: 21 U/L (ref 6–29)
AST SERPL-CCNC: 17 U/L (ref 10–30)
BASOPHILS # BLD AUTO: 41 CELLS/UL (ref 0–200)
BASOPHILS NFR BLD AUTO: 0.7 %
BILIRUB SERPL-MCNC: 1.2 MG/DL (ref 0.2–1.2)
BUN SERPL-MCNC: 11 MG/DL (ref 7–25)
BUN/CREAT SERPL: NORMAL (CALC) (ref 6–22)
CALCIUM SERPL-MCNC: 9.5 MG/DL (ref 8.6–10.2)
CHLORIDE SERPL-SCNC: 102 MMOL/L (ref 98–110)
CHOLEST SERPL-MCNC: 169 MG/DL
CHOLEST/HDLC SERPL: 3.9 (CALC)
CO2 SERPL-SCNC: 26 MMOL/L (ref 20–32)
CREAT SERPL-MCNC: 0.88 MG/DL (ref 0.5–0.97)
EOSINOPHIL # BLD AUTO: 209 CELLS/UL (ref 15–500)
EOSINOPHIL NFR BLD AUTO: 3.6 %
ERYTHROCYTE [DISTWIDTH] IN BLOOD BY AUTOMATED COUNT: 12.2 % (ref 11–15)
GFR/BSA.PRED SERPLBLD CYS-BASED-ARV: 86 ML/MIN/1.73M2
GLOBULIN SER CALC-MCNC: 2.5 G/DL (CALC) (ref 1.9–3.7)
GLUCOSE SERPL-MCNC: 78 MG/DL (ref 65–99)
HCT VFR BLD AUTO: 39.2 % (ref 35–45)
HCV AB S/CO SERPL IA: 0.07
HCV AB SERPL QL IA: NORMAL
HDLC SERPL-MCNC: 43 MG/DL
HGB BLD-MCNC: 13.4 G/DL (ref 11.7–15.5)
LDLC SERPL CALC-MCNC: 99 MG/DL (CALC)
LYMPHOCYTES # BLD AUTO: 1902 CELLS/UL (ref 850–3900)
LYMPHOCYTES NFR BLD AUTO: 32.8 %
MCH RBC QN AUTO: 29.8 PG (ref 27–33)
MCHC RBC AUTO-ENTMCNC: 34.2 G/DL (ref 32–36)
MCV RBC AUTO: 87.3 FL (ref 80–100)
MONOCYTES # BLD AUTO: 389 CELLS/UL (ref 200–950)
MONOCYTES NFR BLD AUTO: 6.7 %
NEUTROPHILS # BLD AUTO: 3260 CELLS/UL (ref 1500–7800)
NEUTROPHILS NFR BLD AUTO: 56.2 %
NONHDLC SERPL-MCNC: 126 MG/DL (CALC)
PLATELET # BLD AUTO: 242 THOUSAND/UL (ref 140–400)
PMV BLD REES-ECKER: 11.8 FL (ref 7.5–12.5)
POTASSIUM SERPL-SCNC: 3.9 MMOL/L (ref 3.5–5.3)
PROT SERPL-MCNC: 6.9 G/DL (ref 6.1–8.1)
RBC # BLD AUTO: 4.49 MILLION/UL (ref 3.8–5.1)
SODIUM SERPL-SCNC: 137 MMOL/L (ref 135–146)
TRIGL SERPL-MCNC: 171 MG/DL
TSH SERPL-ACNC: 1.85 MIU/L
WBC # BLD AUTO: 5.8 THOUSAND/UL (ref 3.8–10.8)

## 2023-04-03 ENCOUNTER — OFFICE VISIT (OUTPATIENT)
Dept: FAMILY MEDICINE CLINIC | Facility: HOSPITAL | Age: 38
End: 2023-04-03

## 2023-04-03 VITALS
DIASTOLIC BLOOD PRESSURE: 70 MMHG | OXYGEN SATURATION: 97 % | BODY MASS INDEX: 32.2 KG/M2 | HEART RATE: 83 BPM | SYSTOLIC BLOOD PRESSURE: 110 MMHG | HEIGHT: 62 IN | WEIGHT: 175 LBS

## 2023-04-03 DIAGNOSIS — M62.08 DIASTASIS OF RECTUS ABDOMINIS: ICD-10-CM

## 2023-04-03 DIAGNOSIS — F41.1 GAD (GENERALIZED ANXIETY DISORDER): ICD-10-CM

## 2023-04-03 DIAGNOSIS — L50.9 URTICARIAL RASH: ICD-10-CM

## 2023-04-03 DIAGNOSIS — Z00.00 ROUTINE ADULT HEALTH MAINTENANCE: Primary | ICD-10-CM

## 2023-04-03 DIAGNOSIS — F40.10 SOCIAL ANXIETY DISORDER: ICD-10-CM

## 2023-04-03 DIAGNOSIS — Z71.3 NUTRITIONAL COUNSELING: ICD-10-CM

## 2023-04-03 RX ORDER — ESCITALOPRAM OXALATE 10 MG/1
15 TABLET, FILM COATED ORAL DAILY
Qty: 135 TABLET | Refills: 1 | Status: SHIPPED | OUTPATIENT
Start: 2023-04-03

## 2023-04-03 RX ORDER — ESCITALOPRAM OXALATE 10 MG/1
10 TABLET, FILM COATED ORAL DAILY
Qty: 30 TABLET | Refills: 0 | Status: SHIPPED | OUTPATIENT
Start: 2023-04-03 | End: 2023-04-03 | Stop reason: SDUPTHER

## 2023-04-03 NOTE — PROGRESS NOTES
[facility] [address]    NAME: Elsa Barber  AGE: 42 y o  SEX: female  : 1985   DATE: 4/3/2023   Assessment and Plan:      Diagnosis ICD-10-CM Associated Orders   1  Routine adult health maintenance  Z00 00       2  ABRAHAN (generalized anxiety disorder)  F41 1 Lexapro 10 MG tablet      3  Social anxiety disorder  F40 10       4  Urticarial rash  L50 9       5  Diastasis of rectus abdominis  M62 08 Ambulatory Referral to Physical Therapy      6  Nutritional counseling  Z71 3         Labs reviewed, only HDL & TG's slightly concerning, but discussed in detail  Will recheck in one yr  Rash improved after prior pregnancy, mostly resolved now  Will trial 15 mg lexapro and can up to 20 mg if needed  F/U in 3 months or call sooner     Immunizations and preventive care screenings were discussed with patient today  Appropriate education was printed on patient's after visit summary  Counseling:  Alcohol/drug use: discussed moderation in alcohol intake, the recommendations for healthy alcohol use, and avoidance of illicit drug use  Dental Health: discussed importance of regular tooth brushing, flossing, and dental visits  Injury prevention: discussed safety/seat belts, safety helmets, smoke detectors, carbon dioxide detectors, and smoking near bedding or upholstery  Sexual health: discussed sexually transmitted diseases, partner selection, use of condoms, avoidance of unintended pregnancy, and contraceptive alternatives  · Exercise: the importance of regular exercise/physical activity was discussed  Recommend exercise 3-5 times per week for at least 30 minutes  Return in about 3 months (around 7/3/2023) for Mental Health F/U       Chief Complaint:     Chief Complaint   Patient presents with   • Follow-up     Labs/us       History of Present Illness:     Adult Annual Physical   Patient here for a comprehensive physical exam  "The patient reports no problems  Diet and Physical Activity  · Diet/Nutrition: regular   · Exercise: did have peloton, and used some, running  · No Drug or Tobacco use  Alcohol use - Rare/occasional/frequent: no     General Health  · Sleep: sleeps well, gets 7-8 hours of sleep on average and at times is interrupted by the 10 yo  · Hearing: normal - bilateral   · Vision: no vision problems  · Dental: no dental visits for >1 year and brushes teeth twice daily  · Mood: Lows not quite as low, but still sort of \"numb  \"   · Social Anxiety improving a bit    /GYN Health  · Last menstrual period: regular, but different since [facility]   · Contraceptive method: unprotected, pt aware of risk   · History of STDs?: no, & no HPV on last PAP  · Sees [facility]     Review of Systems:     Review of Systems   Constitutional: Negative for chills and fever  Respiratory: Negative for cough and shortness of breath  Cardiovascular: Negative for chest pain and palpitations  Neurological: Negative for dizziness, light-headedness and headaches  Psychiatric/Behavioral: Positive for dysphoric mood  The patient is nervous/anxious          Past Medical History:     Past Medical History:   Diagnosis Date   • Urinary tract infection       Past Surgical History:     Past Surgical History:   Procedure Laterality Date   • ADENOIDECTOMY     •  SECTION      x2   • TONSILLECTOMY        Social History:     Social History     Socioeconomic History   • Marital status: /Civil Union     Spouse name: None   • Number of children: None   • Years of education: None   • Highest education level: None   Occupational History   • None   Tobacco Use   • Smoking status: Never   • Smokeless tobacco: Never   Vaping Use   • Vaping Use: Never used   Substance and Sexual Activity   • Alcohol use: Never   • Drug use: Never   • Sexual activity: Yes     Partners: Male     Birth control/protection: Coitus interruptus, Condom Male, None   Other " "Topics Concern   • None   Social History Narrative   • None     Social Determinants of Health     Financial Resource Strain: Not on file   Food Insecurity: Not on file   Transportation Needs: Not on file   Physical Activity: Not on file   Stress: Not on file   Social Connections: Not on file   Intimate Partner Violence: Not on file   Housing Stability: Not on file      Family History:     Family History   Problem Relation Age of Onset   • Diabetes Mother    • Depression Mother    • Anxiety disorder Mother    • Hearing loss Father    • Alcohol abuse Paternal Grandfather    • Dementia Paternal Grandmother    • Glaucoma Paternal Grandmother    • Depression Maternal Aunt    • Depression Sister       Current Medications:     Current Outpatient Medications   Medication Sig Dispense Refill   • Lexapro 10 MG tablet Take 1 5 tablets (15 mg total) by mouth daily 135 tablet 1     No current facility-administered medications for this visit  Allergies:     No Known Allergies   Physical Exam:     /70   Pulse 83   Ht 5' 2\" (1 575 m)   Wt 79 4 kg (175 lb)   SpO2 97%   BMI 32 01 kg/m²     Physical Exam  Vitals and nursing note reviewed  Constitutional:       General: She is not in acute distress  Appearance: Normal appearance  She is well-developed  She is obese  She is not ill-appearing  HENT:      Head: Normocephalic and atraumatic  Right Ear: Tympanic membrane, ear canal and external ear normal  There is no impacted cerumen  Left Ear: Tympanic membrane, ear canal and external ear normal  There is no impacted cerumen  Nose: Nose normal  No congestion or rhinorrhea  Mouth/Throat:      Mouth: Mucous membranes are moist       Pharynx: Oropharynx is clear  No oropharyngeal exudate or posterior oropharyngeal erythema  Eyes:      General: No scleral icterus  Right eye: No discharge  Left eye: No discharge        Conjunctiva/sclera: Conjunctivae normal    Neck:      Comments: No " thyromegaly or nodules  Cardiovascular:      Rate and Rhythm: Normal rate and regular rhythm  Pulses: Normal pulses  Heart sounds: Normal heart sounds  No murmur heard  No friction rub  No gallop  Pulmonary:      Effort: Pulmonary effort is normal  No respiratory distress  Breath sounds: Normal breath sounds  No stridor  No wheezing  Abdominal:      General: Abdomen is flat  Bowel sounds are normal  There is no distension  Palpations: Abdomen is soft  There is no mass  Tenderness: There is no abdominal tenderness  Comments: Diastasis Recti worse supraumbilically   Musculoskeletal:         General: Normal range of motion  Cervical back: Normal range of motion and neck supple  No rigidity or tenderness  Right lower leg: No edema  Left lower leg: No edema  Lymphadenopathy:      Cervical: No cervical adenopathy  Skin:     General: Skin is warm and dry  Capillary Refill: Capillary refill takes less than 2 seconds  Coloration: Skin is not jaundiced or pale  Neurological:      Mental Status: She is alert and oriented to person, place, and time  Gait: Gait normal    Psychiatric:         Mood and Affect: Mood normal          Behavior: Behavior normal          Thought Content:  Thought content normal          Judgment: Judgment normal         DO Ravinder Gotti [address] 101

## 2023-04-04 ENCOUNTER — TELEPHONE (OUTPATIENT)
Dept: ADMINISTRATIVE | Facility: OTHER | Age: 38
End: 2023-04-04

## 2023-04-04 NOTE — LETTER
Procedure Request Form: Cervical Cancer Screening      Date Requested: 23  Patient: Gilbert Hickey  Patient : 1985   Referring Provider: María Beltran, DO        Date of Procedure ______________________________       The above patient has informed us that they have completed their   most recent Cervical Cancer Screening at your facility  Please complete   this form and attach all corresponding procedure reports/results  Comments __________________________________________________________  ____________________________________________________________________  ____________________________________________________________________  ____________________________________________________________________    Facility Completing Procedure _________________________________________    Form Completed By (print name) _______________________________________      Signature __________________________________________________________      These reports are needed for  compliance  Please fax this completed form and a copy of the procedure report to our office located at [place] [address] as soon as possible to Fax [phone] mamie Sinclair Milder: Phone [phone]    We thank you for your assistance in treating our mutual patient

## 2023-04-04 NOTE — TELEPHONE ENCOUNTER
Upon review of the In Basket request and the patient's chart, initial outreach has been made via fax to facility  Please see Contacts section for details       Thank you  Irving Cartwright

## 2023-04-04 NOTE — TELEPHONE ENCOUNTER
----- Message from Emily Rock MA sent at 4/3/2023  1:41 PM EDT -----  04/03/23 1:42 PM    Hello, our patient David Langston has had  pap and a mammogram completed/performed  Please assist in updating the patient chart by [facility] 2018 The date of service is ? ?     Thank you,  Emily Rock MA  [facility]

## 2023-04-04 NOTE — LETTER
Procedure Request Form: Mammogram      Date Requested: 23  Patient: Reino Adams  Patient : 1985   Referring Provider: Aamir Beltran, DO        Date of Procedure ______________________________       The above patient has informed us that they have completed their   most recent Mammogram at your facility  Please complete   this form and attach all corresponding procedure reports/results  Comments __________________________________________________________  ____________________________________________________________________  ____________________________________________________________________  ____________________________________________________________________    Facility Completing Procedure _________________________________________    Form Completed By (print name) _______________________________________      Signature __________________________________________________________      These reports are needed for  compliance  Please fax this completed form and a copy of the procedure report to our office located at [place] [address] as soon as possible to Fax [phone] mamie Valdivia: Phone [phone]    We thank you for your assistance in treating our mutual patient

## 2023-04-04 NOTE — TELEPHONE ENCOUNTER
Upon review of the In Basket request we were able to locate, review, and update the patient chart as requested for Pap Smear (HPV) aka Cervical Cancer Screening  Any additional questions or concerns should be emailed to the Practice Liaisons via the appropriate education email address, please do not reply via In Basket      Thank you  Rylie Echevarria

## 2023-04-04 NOTE — TELEPHONE ENCOUNTER
Upon review of the In Basket request we have found as a result of outreach that patient did not have mammogram completed  Any additional questions or concerns should be emailed to the Practice Liaisons via the appropriate education email address, please do not reply via In Basket      Thank you  Chon Orozco

## 2023-06-27 ENCOUNTER — OFFICE VISIT (OUTPATIENT)
Dept: URGENT CARE | Facility: CLINIC | Age: 38
End: 2023-06-27
Payer: COMMERCIAL

## 2023-06-27 VITALS
SYSTOLIC BLOOD PRESSURE: 115 MMHG | TEMPERATURE: 99.5 F | BODY MASS INDEX: 33.13 KG/M2 | OXYGEN SATURATION: 99 % | HEIGHT: 62 IN | HEART RATE: 86 BPM | WEIGHT: 180 LBS | DIASTOLIC BLOOD PRESSURE: 68 MMHG | RESPIRATION RATE: 18 BRPM

## 2023-06-27 DIAGNOSIS — R21 RASH: Primary | ICD-10-CM

## 2023-06-27 PROCEDURE — 99213 OFFICE O/P EST LOW 20 MIN: CPT

## 2023-06-27 RX ORDER — METHYLPREDNISOLONE 4 MG/1
TABLET ORAL
Qty: 1 EACH | Refills: 0 | Status: SHIPPED | OUTPATIENT
Start: 2023-06-27

## 2023-06-27 RX ORDER — HYDROCORTISONE 25 MG/ML
LOTION TOPICAL 2 TIMES DAILY
Qty: 59 ML | Refills: 0 | Status: SHIPPED | OUTPATIENT
Start: 2023-06-27

## 2023-06-27 NOTE — PROGRESS NOTES
[facility] Now        NAME: Saumya Olmstead is a 34 y o  female  : 1985    MRN: [id number]  DATE: 2023  TIME: 3:45 PM    Assessment and Plan   Rash [R21]  1  Rash          - Presumed poison ivy dermatitis     Patient Instructions       Follow up with PCP in 3-5 days  Proceed to  ER if symptoms worsen  Chief Complaint     Chief Complaint   Patient presents with   • Rash     Pt presents with itchy bumpy rash on left side of neck, stomach, and left lower eyelid  History of Present Illness       35 y/o F presents for rash x 2 days  Pt admits to itching  Denies discharge  Daughter has ring work  No use of OTC meds/lotions  Does Garden everyday  Rash located under R eye, L neck, and RLQ of ABD  Review of Systems   Review of Systems   Constitutional: Negative for chills and fever  Skin: Positive for rash  Current Medications       Current Outpatient Medications:   •  Lexapro 10 MG tablet, Take 1 5 tablets (15 mg total) by mouth daily, Disp: 135 tablet, Rfl: 1    Current Allergies     Allergies as of 2023   • (No Known Allergies)            The following portions of the patient's history were reviewed and updated as appropriate: allergies, current medications, past family history, past medical history, past social history, past surgical history and problem list      Past Medical History:   Diagnosis Date   • Urinary tract infection        Past Surgical History:   Procedure Laterality Date   • ADENOIDECTOMY     •  SECTION      x2   • TONSILLECTOMY         Family History   Problem Relation Age of Onset   • Diabetes Mother    • Depression Mother    • Anxiety disorder Mother    • Hearing loss Father    • Alcohol abuse Paternal Grandfather    • Dementia Paternal Grandmother    • Glaucoma Paternal Grandmother    • Depression Maternal Aunt    • Depression Sister          Medications have been verified          Objective   /68   Pulse 86   Temp 99 5 °F (37 5 "°C)   Resp 18   Ht 5' 2\" (1 575 m)   Wt 81 6 kg (180 lb)   SpO2 99%   BMI 32 92 kg/m²   No LMP recorded  Physical Exam     Physical Exam  Vitals and nursing note reviewed  Constitutional:       General: She is not in acute distress  Appearance: She is not toxic-appearing  HENT:      Head: Normocephalic and atraumatic  Eyes:      Conjunctiva/sclera: Conjunctivae normal    Pulmonary:      Effort: Pulmonary effort is normal    Skin:     Comments: Erythema and raised lesion under R eye, L side neck, and RLQ of ABD  No discharge or signs of infection   Neurological:      Mental Status: She is alert     Psychiatric:         Mood and Affect: Mood normal          Behavior: Behavior normal                    "

## 2023-07-11 ENCOUNTER — OFFICE VISIT (OUTPATIENT)
Dept: FAMILY MEDICINE CLINIC | Facility: HOSPITAL | Age: 38
End: 2023-07-11
Payer: COMMERCIAL

## 2023-07-11 VITALS
SYSTOLIC BLOOD PRESSURE: 118 MMHG | HEART RATE: 79 BPM | BODY MASS INDEX: 33.86 KG/M2 | HEIGHT: 62 IN | WEIGHT: 184 LBS | OXYGEN SATURATION: 97 % | DIASTOLIC BLOOD PRESSURE: 76 MMHG

## 2023-07-11 DIAGNOSIS — Z13.1 SCREENING FOR DIABETES MELLITUS: ICD-10-CM

## 2023-07-11 DIAGNOSIS — Z13.29 SCREENING FOR THYROID DISORDER: ICD-10-CM

## 2023-07-11 DIAGNOSIS — F41.1 GAD (GENERALIZED ANXIETY DISORDER): ICD-10-CM

## 2023-07-11 DIAGNOSIS — Z12.31 ENCOUNTER FOR SCREENING MAMMOGRAM FOR BREAST CANCER: Primary | ICD-10-CM

## 2023-07-11 DIAGNOSIS — Z13.0 SCREENING FOR IRON DEFICIENCY ANEMIA: ICD-10-CM

## 2023-07-11 DIAGNOSIS — Z13.220 SCREENING FOR HYPERLIPIDEMIA: ICD-10-CM

## 2023-07-11 PROCEDURE — 99214 OFFICE O/P EST MOD 30 MIN: CPT | Performed by: STUDENT IN AN ORGANIZED HEALTH CARE EDUCATION/TRAINING PROGRAM

## 2023-07-11 RX ORDER — ESCITALOPRAM OXALATE 10 MG/1
15 TABLET, FILM COATED ORAL DAILY
Qty: 135 TABLET | Refills: 2 | Status: SHIPPED | OUTPATIENT
Start: 2023-09-20

## 2023-07-11 NOTE — PROGRESS NOTES
[address], DO    Assessment/Plan:      Diagnosis ICD-10-CM Associated Orders   1. Encounter for screening mammogram for breast cancer  Z12.31 Mammo screening bilateral w 3d & cad      2. ABRAHAN (generalized anxiety disorder)  F41.1 Lexapro 10 MG tablet      3. Screening for iron deficiency anemia  Z13.0 CBC and differential      4. Screening for diabetes mellitus  Z13.1 Comprehensive metabolic panel      5. Screening for hyperlipidemia  Z13.220 Lipid Panel with Direct LDL reflex      6. Screening for thyroid disorder  Z13.29 TSH, 3rd generation        • Labs for next annual/ after Jan 24. • C/W lexapro 15 mg, call if any issues. Could inc to 20 mg if needed. • Mammo first ever due. • Appt with GYN in sept. Due for PAP   Return in about 7 months (around 2/11/2024) for Annual Physical.  • Patient may call or return to office with any questions or concerns. ______________________________________________________________________  Subjective:     Patient ID: Andreia Varner is a 34 y.o. female. HPI  Andreia Varner  Chief Complaint   Patient presents with   • Follow-up     3 month     Just turned 33 yo, no family hx of BR CA. Due for first Mammo. Finished steroids. Feeling better, 10 - 15 mg helping more. Less overwhelmed. Better with family & friends. More interest in activities. Gardening, & slowly some strength training & will add cardio. The following portions of the patient's history were reviewed and updated as appropriate: allergies, current medications, past medical history, and problem list.    Review of Systems   Constitutional: Negative for chills and fever. Respiratory: Negative for cough and shortness of breath. Cardiovascular: Negative for chest pain and palpitations. Psychiatric/Behavioral: Positive for dysphoric mood. Negative for self-injury, sleep disturbance and suicidal ideas. The patient is nervous/anxious.         Objective:      Vitals: 07/11/23 0920   BP: 118/76   Pulse: 79   SpO2: 97%      Physical Exam  Vitals reviewed. Constitutional:       General: She is not in acute distress. Appearance: Normal appearance. She is well-developed. She is obese. She is not ill-appearing. HENT:      Head: Normocephalic and atraumatic. Eyes:      General: No scleral icterus. Right eye: No discharge. Left eye: No discharge. Cardiovascular:      Rate and Rhythm: Normal rate and regular rhythm. Pulses: Normal pulses. Heart sounds: Normal heart sounds. No murmur heard. Pulmonary:      Effort: Pulmonary effort is normal. No respiratory distress. Breath sounds: Normal breath sounds. No stridor. No wheezing. Musculoskeletal:      Cervical back: Normal range of motion. No rigidity. Skin:     General: Skin is warm. Neurological:      Mental Status: She is alert and oriented to person, place, and time. Gait: Gait normal.   Psychiatric:         Mood and Affect: Mood normal.         Behavior: Behavior normal.         Thought Content: Thought content normal.         Judgment: Judgment normal.           Portions of the record may have been created with voice recognition software. Occasional wrong word or "sound alike" substitutions may have occurred due to the inherent limitations of voice recognition software. Please review the chart carefully and recognize, using context, where substitutions/typographical errors may have occurred.

## 2023-08-10 ENCOUNTER — APPOINTMENT (OUTPATIENT)
Dept: PHYSICAL THERAPY | Facility: CLINIC | Age: 38
End: 2023-08-10
Payer: COMMERCIAL

## 2023-08-17 ENCOUNTER — EVALUATION (OUTPATIENT)
Dept: PHYSICAL THERAPY | Facility: CLINIC | Age: 38
End: 2023-08-17
Payer: COMMERCIAL

## 2023-08-17 DIAGNOSIS — M62.08 DIASTASIS OF RECTUS ABDOMINIS: Primary | ICD-10-CM

## 2023-08-17 DIAGNOSIS — M62.89 PELVIC FLOOR DYSFUNCTION IN FEMALE: ICD-10-CM

## 2023-08-17 PROCEDURE — 97161 PT EVAL LOW COMPLEX 20 MIN: CPT | Performed by: PHYSICAL THERAPIST

## 2023-08-17 PROCEDURE — 97112 NEUROMUSCULAR REEDUCATION: CPT | Performed by: PHYSICAL THERAPIST

## 2023-08-17 PROCEDURE — 97110 THERAPEUTIC EXERCISES: CPT | Performed by: PHYSICAL THERAPIST

## 2023-08-17 NOTE — PROGRESS NOTES
PT Evaluation     Today's date: 2023  Patient name: Hui Villalta  : 1985  MRN: [id number]  Referring provider: Vee Guerrero DO  Dx:   Encounter Diagnosis     ICD-10-CM    1. Diastasis of rectus abdominis  M62.08 Ambulatory Referral to Physical Therapy      2. Pelvic floor dysfunction in female  M62.89                      Assessment  Assessment details: Patient presents with diastasis of rectus abdominis and pelvic floor dysfunction. Demonstrates decrease in lumbar ROM, decrease in core and pelvic floor (PFM) strength/endurance, and decrease in PFM relaxation. Arcelia Partida presents with the impairments as listed above and would benefit from skilled Physical Therapy to address these impairments in order to maximize functional capacity and return to prior level of function. Thank you for this referral!  Impairments: abnormal muscle tone, abnormal or restricted ROM, impaired physical strength, lacks appropriate home exercise program, pain with function, poor posture  and poor body mechanics    Goals  Impairment Goals:  1. Decrease lower back pain to 0-1/10 with heavy ADLs in 8 weeks  2. Increase TA strength to perform resisted supine SLR with pelvic stability b/l in 8 weeks  3. Increase pelvic floor muscle endurance to perform 5"x10 kegels in sitting without fatigue in 8 weeks    Functional Goals:  1. Patient is independent in home exercise program in 8 weeks  2. Patient demonstrates proper body mechanics with lifting with core stability and without back pain in 8 weeks  3.  Patient is able to perform heavy ADLs with core strength and no reported increase in back pain in 8 weeks    Plan  Patient would benefit from: skilled physical therapy and PT eval  Planned modality interventions: biofeedback  Planned therapy interventions: joint mobilization, manual therapy, neuromuscular re-education, patient education, postural training, strengthening, therapeutic activities, therapeutic exercise and home exercise program  Frequency: 1x month  Duration in weeks: 8  Plan of Care expiration date: 10/12/2023  Treatment plan discussed with: patient        PT Pelvic Floor Subjective:   History of Present Illness:   33 yo female presents with concerns of diastasis rectus abdominus and weak core following the birth of her 2nd child 5 years ago. Pt reports unable to strengthen her core due to abdominal separation and unable to perform sit ups. Pt denies pelvic pain, but reports long history of intermitting lower back pain with heavy ADLs. Pt admits to mild urinary leakage with laughing and sneezing and mild pelvic pain during intercourse. Social Support:     Work status: employed full time  OB/ gyn History    Gestational History:     Prior Pregnancy: Yes      Number of prior pregnancies: 2    Number of term pregnancies: 2    Delivery Type:  section      Number of caesarian sections: 2    Menstrual History:      Menstrual irregularities regular menses    Painful periods:  No difficulty managing menstrual pain    Tolerates tampons: yes  Bladder Function:     Voiding Difficulties comments:     Urinary frequency: every 2-3 hours. Urinary leakage: urine leakage    Urinary leakage aggravated by: sneezing and laughing    Urinary leakage not aggravated by: walking to the bathroom    Nocturia (episodes per night): 0    Painful urination: No      Intake (ounces): Water intake (oz): 40-60 oz.  Coffee: 20,   Incontinence Management:     Pads/Diaper Use:  None    Patient has attempted at least 4 weeks of independent pelvic floor strengthening exercises without a resolution of symptoms  Bowel Function:     Bowel frequency: every 2 days and daily    Stool softener use: no stool softeners  Sexual Function:     Sexually Active:  Sexually active    Pain during intercourse: Yes    Pain:     Current pain ratin    At best pain ratin    At worst pain ratin    Location:  Lumbar    Onset:  More than 2 years ago    Quality:  Ruben Course ache  Patient Goals:     Patient goals for therapy:  Improved quality of life and improved bladder or bowel function    Other patient goals:  Improve core strength      Objective     Active Range of Motion     Lumbar   Flexion:  Restriction level: minimal  Extension:  Restriction level: moderate    Additional Active Range of Motion Details  Increased lumbar lordosis in standing noted    Mild hypomobility in sacrum  Moderate hypomobility in lumbar spine  Moderate spasm in lumbar erector spinae muscles R>L    Strength/Myotome Testing     Left Hip   Planes of Motion   Flexion: 4+ (pelvic instability)  Extension: 4+  Abduction: 4+  Adduction: 4+  External rotation: 4+  Internal rotation: 4+    Right Hip   Planes of Motion   Flexion: 4+ (pelvic instability)  Extension: 4+  Abduction: 4+  Adduction: 4+  External rotation: 4+  Internal rotation: 4    Left Knee   Flexion: 4+  Extension: 4+    Right Knee   Flexion: 4+  Extension: 4+    Additional Strength Details  Bearing down noted when instructed to engage core. Overactivity of rectus abdominus noted      Abdominal Assessment:    Abdominal Assessment: Performed external palpation of pelvic floor muscles (PFM), demonstrated mild-moderate tension in posterior PFM around ischial tuberosity. Light PFM contraction was noted through external palpation and pt reported awareness of PFM contraction and relaxation. Pt noted improvement in PFM relaxation following TPR and stretches to PFM. Patient may benefit from internal pelvic floor assessment.     Diastatis   Diastasis recti present? no  3" above umbilicus (# fingers): 0  Umbilicus (# fingers): 1  3" below umbilicus (# fingers): 0  Palpation of linea alba:Doming of abdomen noted with supine head lift              Precautions: anxiety/depression  Note: focus on a few exercises to perform in short intervals, leakage with laughing/sneezing  Focus: PFM/TA strengthening    Manuals 8/17            TPR externally to PFM in s/l [facility] Neuro Re-Ed             Supine TA instructed            Supine yovanny instructed            Supine yovanny<TA 5"x5            PFM relaxation and awareness intruduced                                                   Ther Ex             Supine Butterfly stretch 30"x2            Supine Happy Baby stretch 30"x1            Seated Happy Baby stretch                                                                              Ther Activity                                       Gait Training                                       Modalities

## 2023-08-24 ENCOUNTER — OFFICE VISIT (OUTPATIENT)
Dept: PHYSICAL THERAPY | Facility: CLINIC | Age: 38
End: 2023-08-24
Payer: COMMERCIAL

## 2023-08-24 DIAGNOSIS — M62.89 PELVIC FLOOR DYSFUNCTION IN FEMALE: ICD-10-CM

## 2023-08-24 DIAGNOSIS — M62.08 DIASTASIS OF RECTUS ABDOMINIS: Primary | ICD-10-CM

## 2023-08-24 PROCEDURE — 97110 THERAPEUTIC EXERCISES: CPT | Performed by: PHYSICAL THERAPIST

## 2023-08-24 PROCEDURE — 97140 MANUAL THERAPY 1/> REGIONS: CPT | Performed by: PHYSICAL THERAPIST

## 2023-08-24 PROCEDURE — 97112 NEUROMUSCULAR REEDUCATION: CPT | Performed by: PHYSICAL THERAPIST

## 2023-08-24 NOTE — PROGRESS NOTES
Daily Note     Today's date: 2023  Patient name: Cielo Rey  : 1985  MRN: [id number]  Referring provider: Marlena Kussmaul, DO  Dx:   Encounter Diagnosis     ICD-10-CM    1. Diastasis of rectus abdominis  M62.08       2. Pelvic floor dysfunction in female  M62.89                      Subjective: Pt reports performing HEP 2x/day and notes soreness in TA for the workouts. Objective: See treatment diary below      Assessment: Pt notes 4/10 PFM tension in sitting, but 1/10 tension in supine. Pt noted decrease in PFM tension with supine happy baby stretch. Pt noted groin tension following 5 reps of supine kegel>TA, but subsided after butterfly stretch. Pt tolerated external TPR to PFM in s/l with improvement in PFM relaxation noted. Added seated kegel 25% and seated kegel>TA 25% contraction with appropriate challenge noted. Patient would benefit from continued PT      Plan: Continue per plan of care.       Precautions: anxiety/depression  Note: focus on a few exercises for HEP to perform in short intervals, leakage with laughing/sneezing  Focus: PFM/TA strengthening    Manuals            TPR externally to PFM in s/l JASSON JASSON  posterior + anterior PFM                                                  Neuro Re-Ed             Supine TA instructed            Supine kegel instructed            Supine kegel<TA 5"x5 5"x5  F/b butterfly stretch  2 sets           PFM relaxation and awareness introduced throughout tx           Quadruped TA  nv           S/l Clam > reverse clam             Seated kegel  25%  1"x10           Seated Kegel>TA  25%  1"x5  2 sets           Ther Ex             Supine Butterfly stretch 30"x2 30"x1,  30"x2,  30"x1           Supine Happy Baby stretch 30"x1 30"x1           Seated Happy Baby stretch  30"x2                                                                            Ther Activity                                       Gait Training Modalities

## 2023-08-31 ENCOUNTER — OFFICE VISIT (OUTPATIENT)
Dept: PHYSICAL THERAPY | Facility: CLINIC | Age: 38
End: 2023-08-31
Payer: COMMERCIAL

## 2023-08-31 DIAGNOSIS — M62.08 DIASTASIS OF RECTUS ABDOMINIS: Primary | ICD-10-CM

## 2023-08-31 DIAGNOSIS — M62.89 PELVIC FLOOR DYSFUNCTION IN FEMALE: ICD-10-CM

## 2023-08-31 PROCEDURE — 97112 NEUROMUSCULAR REEDUCATION: CPT | Performed by: PHYSICAL THERAPIST

## 2023-08-31 PROCEDURE — 97110 THERAPEUTIC EXERCISES: CPT | Performed by: PHYSICAL THERAPIST

## 2023-08-31 PROCEDURE — 97140 MANUAL THERAPY 1/> REGIONS: CPT | Performed by: PHYSICAL THERAPIST

## 2023-08-31 NOTE — PROGRESS NOTES
Daily Note     Today's date: 2023  Patient name: Mook Guillermo  : 1985  MRN: [id number]  Referring provider: Sherrie Matos DO  Dx:   Encounter Diagnosis     ICD-10-CM    1. Diastasis of rectus abdominis  M62.08       2. Pelvic floor dysfunction in female  M62.89                      Subjective: Pt reports consistency with HEP and increased endurance with supine kegel exercises, but challenge still noted with seated kegel. Objective: See treatment diary below      Assessment: Pt able to perform active supine SLR with pelvic stability. Added SLR with kegel/TA with core fatigue noted after 4 reps b/l. Added s/l reverse clamshell with core fatigue noted. Added quadruped pelvic tilt with TA fatigue. Pt noted increase in abdominal tension post exercises. Assessed fascial tension in abdomen, demonstrated fascial tension in lumbar and lower abdomen around bladder. Following VFM and VFM compression to pelvis demonstrates mild decrease in fascial tension. Patient would benefit from continued PT      Plan: Continue per plan of care.       Precautions: anxiety/depression  Note: focus on a few exercises for HEP to perform in short intervals, leakage with laughing/sneezing  Focus: PFM/TA strengthening    Manuals           TPR externally to PFM in s/l  [facility] JASSON  posterior + anterior PFM           VFM    lumbar, bladder          VFM compression   pelvis                       Neuro Re-Ed             Supine TA instructed            Supine kegel instructed            Supine kegel<TA 5"x5 5"x5  F/b butterfly stretch  2 sets           PFM relaxation and awareness introduced throughout tx           SLR with kegel>TA   4x, 3x R  4x L          Quadruped TA  nv 10"x5  fatigued          S/l reverse clam with TA   7x ea          Seated kegel  25%  1"x10           Seated Kegel>TA  25%  1"x5  2 sets           Ther Ex             Supine Butterfly stretch 30"x2 30"x1,  30"x2,  30"x1 thorughout tx          Supine Happy Baby stretch 30"x1 30"x1           Seated Happy Baby stretch  30"x2                                                                            Ther Activity                                       Gait Training                                       Modalities

## 2023-09-07 ENCOUNTER — OFFICE VISIT (OUTPATIENT)
Dept: PHYSICAL THERAPY | Facility: CLINIC | Age: 38
End: 2023-09-07
Payer: COMMERCIAL

## 2023-09-07 DIAGNOSIS — M62.08 DIASTASIS OF RECTUS ABDOMINIS: Primary | ICD-10-CM

## 2023-09-07 DIAGNOSIS — F41.1 GAD (GENERALIZED ANXIETY DISORDER): ICD-10-CM

## 2023-09-07 DIAGNOSIS — M62.89 PELVIC FLOOR DYSFUNCTION IN FEMALE: ICD-10-CM

## 2023-09-07 PROCEDURE — 97530 THERAPEUTIC ACTIVITIES: CPT | Performed by: PHYSICAL THERAPIST

## 2023-09-07 PROCEDURE — 97112 NEUROMUSCULAR REEDUCATION: CPT | Performed by: PHYSICAL THERAPIST

## 2023-09-07 PROCEDURE — 97110 THERAPEUTIC EXERCISES: CPT | Performed by: PHYSICAL THERAPIST

## 2023-09-07 RX ORDER — ESCITALOPRAM OXALATE 10 MG/1
TABLET ORAL
Qty: 30 TABLET | Refills: 5 | Status: SHIPPED | OUTPATIENT
Start: 2023-09-07

## 2023-09-07 NOTE — PROGRESS NOTES
Daily Note     Today's date: 2023  Patient name: Mook Guillermo  : 1985  MRN: [id number]  Referring provider: Sherrie Matos DO  Dx:   Encounter Diagnosis     ICD-10-CM    1. Diastasis of rectus abdominis  M62.08       2. Pelvic floor dysfunction in female  M62.89                      Subjective: Pt notes she was able to sneeze without urinary leakage the other day when focusing on kj the PFM. Objective: See treatment diary below      Assessment: Pt continues to note TA fatigue with supine SLR. Added sit to stand with 25% PFM contraction with standing up only, pt tolerated with appropriate fatigue. Pt continues to require supine butterfly stretch to help relax PFM complete between each exercise set. Pt tolerated added sit to stand with kegel and standing kegel. Patient exhibited good technique with therapeutic exercises. Plan: Continue per plan of care.       Precautions: anxiety/depression  Note: focus on a few exercises for HEP to perform in short intervals, leakage with laughing/sneezing  Focus: PFM/TA strengthening    Manuals          TPR externally to PFM in s/l  [facility] JASSON  posterior + anterior PFM           VFM    lumbar, bladder          VFM compression   pelvis                       Neuro Re-Ed             Supine TA instructed            Supine kegel instructed            Supine kegel<TA 5"x5 5"x5  F/b butterfly stretch  2 sets           PFM relaxation and awareness introduced throughout tx  throughout tx         SLR with kegel>TA   4x, 3x R  4x L sustained PFM/TA  10x L  8x R         Quadruped TA  nv 10"x5  fatigued 5"x5 fatigued         S/l reverse clam with TA   7x ea 10x2 ea         Seated kegel  25%  1"x10           Seated Kegel>TA  25%  1"x5  2 sets           Standing Kegel>TA    25%  1"x10         Ther Ex             Supine Butterfly stretch 30"x2 30"x1,  30"x2,  30"x1 thorughout tx throughout tx         Supine Happy Baby stretch 30"x1 30"x1 Seated Happy Baby stretch  30"x2  30"x2 after standing kegel                                                                          Ther Activity             Sit to stand      25% kegel up only  12x         Sit to stand with kegel up/down    nv if up only is easy         Mini Squat with kegel    nv         Bed mobility with kegel/TA    5x         Gait Training                                       Modalities

## 2023-09-14 ENCOUNTER — OFFICE VISIT (OUTPATIENT)
Dept: PHYSICAL THERAPY | Facility: CLINIC | Age: 38
End: 2023-09-14
Payer: COMMERCIAL

## 2023-09-14 DIAGNOSIS — M62.89 PELVIC FLOOR DYSFUNCTION IN FEMALE: ICD-10-CM

## 2023-09-14 DIAGNOSIS — M62.08 DIASTASIS OF RECTUS ABDOMINIS: Primary | ICD-10-CM

## 2023-09-14 PROCEDURE — 97112 NEUROMUSCULAR REEDUCATION: CPT

## 2023-09-14 NOTE — PROGRESS NOTES
Daily Note     Today's date: 2023  Patient name: Archana Farrar  : 1985  MRN: [id number]  Referring provider: Chuck Hughes DO  Dx:   Encounter Diagnosis     ICD-10-CM    1. Diastasis of rectus abdominis  M62.08       2. Pelvic floor dysfunction in female  M62.89                      Subjective: Pt reports that she is progressing well with her TE, reports her endurance for holding TE has gotten better and is now able to engage her core throughout her daily activities. Pt reports less than 5 instances of leakage since she was here last, more of a dribble of urine leakage usually a sneeze or a laugh. Objective: See treatment diary below      Assessment: Pt is able to achieve strong TA contractions and maintain throughout factional movements. Pt would benefit from continued PT to increase TA contraction endurance. Plan: Continue per plan of care.       Precautions: anxiety/depression  Note: focus on a few exercises for HEP to perform in short intervals, leakage with laughing/sneezing  Focus: PFM/TA strengthening    Manuals         TPR externally to PFM in s/l JASSON JASSON  posterior + anterior PFM           VFM    lumbar, bladder          VFM compression   pelvis                       Neuro Re-Ed             Supine TA instructed    5"x15        Supine kegel instructed            Supine kegel<TA 5"x5 5"x5  F/b butterfly stretch  2 sets           TA w/ BKFO     x10 ea        PFM relaxation and awareness introduced throughout tx  throughout tx         SLR with kegel>TA   4x, 3x R  4x L sustained PFM/TA  10x L  8x R 2x5 ea        Quadruped TA  nv 10"x5  fatigued 5"x5 fatigued 5"x10         S/l reverse clam with TA   7x ea 10x2 ea         clamshells     3"x20 ea        Seated kegel  25%  1"x10   5"x10        Seated Kegel>TA  25%  1"x5  2 sets    5" x10        Standing Kegel>TA    25%  1"x10         Ther Ex             Supine Butterfly stretch 30"x2 30"x1,  30"x2,  30"x1 thorughout tx throughout tx         Supine Happy Baby stretch 30"x1 30"x1           Seated Happy Baby stretch  30"x2  30"x2 after standing kegel                                                                          Ther Activity             Sit to stand      25% kegel up only  12x         Sit to stand with kegel up/down    nv if up only is easy 6x up only today 5" hold at top        Mini Squat with kegel    nv         Bed mobility with kegel/TA    5x         Gait Training                                       Modalities

## 2023-09-21 ENCOUNTER — OFFICE VISIT (OUTPATIENT)
Dept: PHYSICAL THERAPY | Facility: CLINIC | Age: 38
End: 2023-09-21
Payer: COMMERCIAL

## 2023-09-21 DIAGNOSIS — M62.89 PELVIC FLOOR DYSFUNCTION IN FEMALE: ICD-10-CM

## 2023-09-21 DIAGNOSIS — M62.08 DIASTASIS OF RECTUS ABDOMINIS: Primary | ICD-10-CM

## 2023-09-21 PROCEDURE — 97110 THERAPEUTIC EXERCISES: CPT | Performed by: PHYSICAL THERAPIST

## 2023-09-21 PROCEDURE — 97112 NEUROMUSCULAR REEDUCATION: CPT | Performed by: PHYSICAL THERAPIST

## 2023-09-21 PROCEDURE — 97530 THERAPEUTIC ACTIVITIES: CPT | Performed by: PHYSICAL THERAPIST

## 2023-09-21 NOTE — PROGRESS NOTES
Daily Note     Today's date: 2023  Patient name: Sylvain Bedolla  : 1985  MRN: [id number]  Referring provider: Christina Gaming DO  Dx:   Encounter Diagnosis     ICD-10-CM    1. Diastasis of rectus abdominis  M62.08       2. Pelvic floor dysfunction in female  M62.89                      Subjective: Pt reports feeling stronger overall and no longer experiencing urinary leakage with sneezing, but still must focus on kj PFM before sneezing to avoid incontinence. Objective: See treatment diary below      Assessment: Pt still notes difficulty with sustaining kegel during sit to stand transfers. Added mini squat with kegel, pt able to sustain kegel when added exhalation during movement. Added rib tuck with TA SLR an progressioned quad TA to modified plank with rib tuck cue. Attempted quick flicks in sitting, but pt noted increase in PFM tension. Will attempt quick flicks at 40% next visit. Patient would benefit from continued PT      Plan: Continue per plan of care.       Precautions: anxiety/depression  Note: focus on a few exercises for HEP to perform in short intervals, leakage with laughing/sneezing  Focus: PFM/TA strengthening    Manuals        TPR externally to PFM in s/l JASSON JASSON  posterior + anterior PFM           VFM    lumbar, bladder          VFM compression   pelvis                       Neuro Re-Ed             Supine TA instructed    5"x15        Supine kegel instructed            Supine kegel<TA 5"x5 5"x5  F/b butterfly stretch  2 sets           TA w/ BKFO     x10 ea D/c       PFM relaxation and awareness introduced throughout tx  throughout tx         SLR with kegel>TA   4x, 3x R  4x L sustained PFM/TA  10x L  8x R 2x5 ea With rib tuck  10x2 ea       Quadruped TA  nv 10"x5  fatigued 5"x5 fatigued 5"x10  D/c       Modified Plank      10"x3 with rib tuck       S/l reverse clam with TA   7x ea 10x2 ea         clamshells     3"x20 ea        Seated Quick Flicks 10x with exhale  PFM spasm*       Seated Kegel>TA  25%  1"x5  2 sets    5" x10        Standing Kegel>TA    25%  1"x10  25% 1"x10       Ther Ex             Supine Butterfly stretch 30"x2 30"x1,  30"x2,  30"x1 thorughout tx throughout tx  30" throuhgout tx       Supine Happy Baby stretch 30"x1 30"x1           Seated Happy Baby stretch  30"x2  30"x2 after standing kegel  30"x2 after standing kegels                                                                        Ther Activity             Sit to stand      25% kegel up only  12x         Sit to stand with kegel up/down    nv if up only is easy 6x up only today 5" hold at top        Mini Squat with kegel    nv  With exhalation  10x       Bed mobility with kegel/TA    5x         Gait Training                                       Modalities

## 2023-09-28 ENCOUNTER — OFFICE VISIT (OUTPATIENT)
Dept: PHYSICAL THERAPY | Facility: CLINIC | Age: 38
End: 2023-09-28
Payer: COMMERCIAL

## 2023-09-28 DIAGNOSIS — M62.89 PELVIC FLOOR DYSFUNCTION IN FEMALE: ICD-10-CM

## 2023-09-28 DIAGNOSIS — M62.08 DIASTASIS OF RECTUS ABDOMINIS: Primary | ICD-10-CM

## 2023-09-28 PROCEDURE — 97112 NEUROMUSCULAR REEDUCATION: CPT | Performed by: PHYSICAL THERAPIST

## 2023-09-28 PROCEDURE — 97530 THERAPEUTIC ACTIVITIES: CPT | Performed by: PHYSICAL THERAPIST

## 2023-09-28 PROCEDURE — 97140 MANUAL THERAPY 1/> REGIONS: CPT | Performed by: PHYSICAL THERAPIST

## 2023-09-28 PROCEDURE — 97110 THERAPEUTIC EXERCISES: CPT | Performed by: PHYSICAL THERAPIST

## 2023-09-28 NOTE — PROGRESS NOTES
Daily Note     Today's date: 2023  Patient name: Abbey Barrow  : 1985  MRN: [id number]  Referring provider: Huang Marcus DO  Dx:   Encounter Diagnosis     ICD-10-CM    1. Diastasis of rectus abdominis  M62.08       2. Pelvic floor dysfunction in female  M62.89                      Subjective: Pt reports performing HEP, except the last few days due to busy schedule. Pt c/o back pain during menstrual cycle this past week. Objective: See treatment diary below      Assessment: Reviewed HEP and added w/ ER with TA. Added reverse clam from clamshell position with challenge noted. Performed VFM to abdomen/lumbar with decrease in fascial tension noted and relief in back tension reported. Patient would benefit from continued PT      Plan: Continue per plan of care.       Precautions: anxiety/depression  Note: focus on a few exercises for HEP to perform in short intervals, leakage with laughing/sneezing  Focus: PFM/TA strengthening    Manuals       TPR externally to PFM in s/l JASSON JASSON  posterior + anterior PFM           VFM    lumbar, bladder    abdomen/lumbar      VFM compression   pelvis                       Neuro Re-Ed             Supine TA instructed    5"x15        Supine kegel instructed            Supine kegel<TA 5"x5 5"x5  F/b butterfly stretch  2 sets           TA w/ BKFO     x10 ea D/c       PFM relaxation and awareness introduced throughout tx  throughout tx         SLR with kegel>TA   4x, 3x R  4x L sustained PFM/TA  10x L  8x R 2x5 ea With rib tuck  10x2 ea With rib tuck  W/ ER  10x ea      Quadruped TA  nv 10"x5  fatigued 5"x5 fatigued 5"x10  D/c       Modified Plank      10"x3 with rib tuck 20"x3  With rib tuck      S/l clam < reverse clam   7x ea 10x2 ea   sustained clam  10x L  Clam>reverse clam  10x R      clamshells     3"x20 ea        Seated Quick Flicks      05D with exhale  PFM spasm* 50% Focus on full PFM relaxation b/w reps  25x      Seated Kegel>TA  25%  1"x5  2 sets    5" x10        Standing Kegel>TA    25%  1"x10  25% 1"x10       Ther Ex             Supine Butterfly stretch 30"x2 30"x1,  30"x2,  30"x1 thorughout tx throughout tx  30" throuhgout tx 30"x2      Supine Happy Baby stretch 30"x1 30"x1           Seated Happy Baby stretch  30"x2  30"x2 after standing kegel  30"x2 after standing kegels 30"x2 after mini squat      Standing hip adductor stretch       30"x1 ea                                                          Ther Activity             Mini Squat from sitting position         25% kegel  10x      Sit to stand with kegel up/down    nv if up only is easy 6x up only today 5" hold at top        Mini Squat with kegel    nv  With exhalation  10x W/ exhale  9x  fatigued      Bed mobility with kegel/TA    5x         Gait Training                                       Modalities

## 2023-10-05 ENCOUNTER — APPOINTMENT (OUTPATIENT)
Dept: PHYSICAL THERAPY | Facility: CLINIC | Age: 38
End: 2023-10-05
Payer: COMMERCIAL

## 2023-10-09 DIAGNOSIS — F41.1 GAD (GENERALIZED ANXIETY DISORDER): ICD-10-CM

## 2023-10-09 RX ORDER — ESCITALOPRAM OXALATE 10 MG/1
TABLET ORAL
Qty: 90 TABLET | Refills: 2 | Status: SHIPPED | OUTPATIENT
Start: 2023-10-09

## 2023-10-12 ENCOUNTER — OFFICE VISIT (OUTPATIENT)
Dept: PHYSICAL THERAPY | Facility: CLINIC | Age: 38
End: 2023-10-12
Payer: COMMERCIAL

## 2023-10-12 DIAGNOSIS — M62.89 PELVIC FLOOR DYSFUNCTION IN FEMALE: ICD-10-CM

## 2023-10-12 DIAGNOSIS — M62.08 DIASTASIS OF RECTUS ABDOMINIS: Primary | ICD-10-CM

## 2023-10-12 PROCEDURE — 97112 NEUROMUSCULAR REEDUCATION: CPT | Performed by: PHYSICAL THERAPIST

## 2023-10-12 PROCEDURE — 97530 THERAPEUTIC ACTIVITIES: CPT | Performed by: PHYSICAL THERAPIST

## 2023-10-12 PROCEDURE — 97110 THERAPEUTIC EXERCISES: CPT | Performed by: PHYSICAL THERAPIST

## 2023-10-12 NOTE — PROGRESS NOTES
Daily Note     Today's date: 10/12/2023  Patient name: Rafael Rios  : 1985  MRN: [id number]  Referring provider: Renee Morton DO  Dx:   Encounter Diagnosis     ICD-10-CM    1. Diastasis of rectus abdominis  M62.08       2. Pelvic floor dysfunction in female  M62.89                      Subjective: Pt reports performing HEP daily and feeling stronger. Objective: See treatment diary below      Assessment: Pt tolerated progression to sustained kegel during repeated sit to stand transfers. Added standing kegel and kegel with walking, demonstrated decrease pelvic rotation during ambulation and decrease in hip extension. Upon assessment demonstrated decrease in R>L hip extension ROM. Added prone quad stretch and quadruped hip ext for gut strengthening, Pt noted greatest challenge with core stability. Instructed pt in how to engage core with current UE exercises at home. Patient would benefit from continued PT      Plan: Continue per plan of care.       Precautions: anxiety/depression  Note: focus on a few exercises for HEP to perform in short intervals, leakage with laughing/sneezing  Focus: PFM/TA strengthening    Manuals 8/17 8/24 8/31 9/7 9/14 9/21 9/28 10/12     TPR externally to PFM in s/l JASSON JASSON  posterior + anterior PFM           VFM    lumbar, bladder    abdomen/lumbar      VFM compression   pelvis                       Neuro Re-Ed             Supine TA instructed    5"x15        Supine kegel instructed            Supine kegel<TA 5"x5 5"x5  F/b butterfly stretch  2 sets           TA w/ BKFO     x10 ea D/c       PFM relaxation and awareness introduced throughout tx  throughout tx         SLR with kegel>TA   4x, 3x R  4x L sustained PFM/TA  10x L  8x R 2x5 ea With rib tuck  10x2 ea With rib tuck  W/ ER  10x ea      Quadruped Hip Ext (knee kick)        10x ea     Modified Plank      10"x3 with rib tuck 20"x3  With rib tuck reviewed     S/l clam < reverse clam   7x ea 10x2 ea   sustained clam  10x L  Clam>reverse clam  10x R      clamshells     3"x20 ea        Seated Quick Flicks      05M with exhale  PFM spasm* 50% Focus on full PFM relaxation b/w reps  25x      Seated Kegel>TA  25%  1"x5  2 sets    5" x10        Standing Kegel>TA    25%  1"x10  25% 1"x10       Ther Ex             Supine Butterfly stretch 30"x2 30"x1,  30"x2,  30"x1 thorughout tx throughout tx  30" throuhgout tx 30"x2      Supine Happy Baby stretch 30"x1 30"x1           Seated Happy Baby stretch  30"x2  30"x2 after standing kegel  30"x2 after standing kegels 30"x2 after mini squat      Standing hip adductor stretch       30"x1 ea      Prone quad stretch        30"x3 ea                                            Ther Activity             Mini Squat from sitting position         25% kegel  10x      TA with UE workout        instructed     Sit to stand with kegel up/down    nv if up only is easy 6x up only today 5" hold at top   Sustained  8x2     Mini Squat with kegel    nv  With exhalation  10x W/ exhale  9x  fatigued      Bed mobility with kegel/TA    5x         Standing kegel        50%  10"x10     25% kegel with walking        1x  2 sets     Gait Training                                       Modalities

## 2024-01-26 ENCOUNTER — RA CDI HCC (OUTPATIENT)
Dept: OTHER | Facility: HOSPITAL | Age: 39
End: 2024-01-26

## 2024-01-26 NOTE — PROGRESS NOTES
HCC coding opportunities       Chart reviewed, no opportunity found: CHART REVIEWED, NO OPPORTUNITY FOUND        Patients Insurance        Commercial Insurance: [employer] Commercial [employer]        Quality 226: Preventive Care And Screening: Tobacco Use: Screening And Cessation Intervention: Patient screened for tobacco use and is an ex/non-smoker Detail Level: Detailed Quality 110: Preventive Care And Screening: Influenza Immunization: Influenza Immunization previously received during influenza season Quality 111:Pneumonia Vaccination Status For Older Adults: Pneumococcal Vaccination Previously Received

## 2024-03-08 ENCOUNTER — HOSPITAL ENCOUNTER (OUTPATIENT)
Dept: MAMMOGRAPHY | Facility: IMAGING CENTER | Age: 39
Discharge: HOME | End: 2024-03-08
Payer: COMMERCIAL

## 2024-03-08 VITALS — WEIGHT: 180 LBS | HEIGHT: 62 IN | BODY MASS INDEX: 33.13 KG/M2

## 2024-03-08 DIAGNOSIS — Z12.31 ENCOUNTER FOR SCREENING MAMMOGRAM FOR BREAST CANCER: ICD-10-CM

## 2024-03-08 PROCEDURE — 77063 BREAST TOMOSYNTHESIS BI: CPT

## 2024-03-08 PROCEDURE — 77067 SCR MAMMO BI INCL CAD: CPT

## 2024-06-24 DIAGNOSIS — F41.1 GAD (GENERALIZED ANXIETY DISORDER): ICD-10-CM

## 2024-06-24 RX ORDER — ESCITALOPRAM OXALATE 10 MG/1
10 TABLET ORAL DAILY
Qty: 90 TABLET | Refills: 1 | Status: SHIPPED | OUTPATIENT
Start: 2024-06-24

## 2024-12-19 DIAGNOSIS — F41.1 GAD (GENERALIZED ANXIETY DISORDER): ICD-10-CM

## 2024-12-20 RX ORDER — ESCITALOPRAM OXALATE 10 MG/1
10 TABLET ORAL DAILY
Qty: 90 TABLET | Refills: 1 | Status: SHIPPED | OUTPATIENT
Start: 2024-12-20

## 2025-02-08 ENCOUNTER — OFFICE VISIT (OUTPATIENT)
Dept: URGENT CARE | Facility: CLINIC | Age: 40
End: 2025-02-08
Payer: COMMERCIAL

## 2025-02-08 ENCOUNTER — APPOINTMENT (OUTPATIENT)
Dept: RADIOLOGY | Facility: CLINIC | Age: 40
End: 2025-02-08
Payer: COMMERCIAL

## 2025-02-08 VITALS
RESPIRATION RATE: 18 BRPM | DIASTOLIC BLOOD PRESSURE: 64 MMHG | HEART RATE: 71 BPM | SYSTOLIC BLOOD PRESSURE: 119 MMHG | TEMPERATURE: 97.9 F | OXYGEN SATURATION: 97 %

## 2025-02-08 DIAGNOSIS — S69.92XA INJURY OF LEFT HAND, INITIAL ENCOUNTER: Primary | ICD-10-CM

## 2025-02-08 DIAGNOSIS — S69.92XA INJURY OF LEFT HAND, INITIAL ENCOUNTER: ICD-10-CM

## 2025-02-08 PROCEDURE — S9083 URGENT CARE CENTER GLOBAL: HCPCS

## 2025-02-08 PROCEDURE — G0382 LEV 3 HOSP TYPE B ED VISIT: HCPCS

## 2025-02-08 PROCEDURE — 73130 X-RAY EXAM OF HAND: CPT

## 2025-02-08 NOTE — PROGRESS NOTES
[facility]        NAME: Alyssa Kennedy is a 39 y.o. female  : 1985    MRN: [id number]  DATE: 2025  TIME: 8:33 AM    Assessment and Plan   Injury of left hand, initial encounter [S69.92XA]  1. Injury of left hand, initial encounter  XR hand 3+ vw left    Ambulatory Referral to Orthopedic Surgery    Splint        Preliminary reading without acute osseous abnormality.  Patient placed in finger splint.  Orthopedic follow-up    Patient Instructions   The final xray result will appear in your mychart; use the splint until you get the xray result, if the final xray shows a fracture, keep the splint on until you follow-up with orthopedics, if the xray shows no fracture, you can use the splint as needed; take off every 1-2 hours and can take it off to sleep and shower if there is no fracture.   Ice as needed.  Acetaminophen and/or ibuprofen for pain and inflammation.  Follow-up with orthopedics.  PCP follow-up in 3-5 days  Proceed to the ER if symptoms worsen.    If tests have been performed at [facility], our office will contact you with results if changes need to be made to the care plan discussed with you at the visit.  You can review your full results on [facility] MyChart.    Chief Complaint     Chief Complaint   Patient presents with    Left Hand Pain     Pt's left hand slammed against sliding glass door about one and half months ago. Pt reports left hand initially felt better, but started to worsen within last week. Pt unable to open jar and brush daughter's hair without pain.          History of Present Illness       39-year-old female presents for left hand injury sustained approximately 1-1/2 months ago.  Patient admits her hand hit a glass sliding door.  Felt immediate pain of first digit.  Pain lasted and decrease a small amount over the next few weeks, although the pain never fully subsided.  Patient admits over the past week to increasing pain.  Sporadically uses Tylenol and/or  Motrin.        Review of Systems   Review of Systems   Musculoskeletal:  Positive for joint swelling.   Skin:  Negative for color change.         Current Medications       Current Outpatient Medications:     escitalopram (LEXAPRO) 10 mg tablet, Take 1 tablet (10 mg total) by mouth daily (Patient not taking: Reported on 2025), Disp: 90 tablet, Rfl: 1    hydrocortisone 2.5 % lotion, Apply topically 2 (two) times a day, Disp: 59 mL, Rfl: 0    Current Allergies     Allergies as of 2025    (No Known Allergies)            The following portions of the patient's history were reviewed and updated as appropriate: allergies, current medications, past family history, past medical history, past social history, past surgical history and problem list.     Past Medical History:   Diagnosis Date    Urinary tract infection        Past Surgical History:   Procedure Laterality Date    ADENOIDECTOMY       SECTION      x2    TONSILLECTOMY         Family History   Problem Relation Age of Onset    Diabetes Mother     Depression Mother     Anxiety disorder Mother     Hearing loss Father     Depression Sister     No Known Problems Sister     No Known Problems Daughter     No Known Problems Daughter     No Known Problems Maternal Grandmother     No Known Problems Maternal Grandfather     Dementia Paternal Grandmother     Glaucoma Paternal Grandmother     Alcohol abuse Paternal Grandfather     Throat cancer Paternal Grandfather 80    Depression Maternal Aunt     No Known Problems Paternal Aunt          Medications have been verified.        Objective   /64   Pulse 71   Temp 97.9 °F (36.6 °C)   Resp 18   LMP 2025   SpO2 97%   Patient's last menstrual period was 2025.       Physical Exam     Physical Exam  Vitals and nursing note reviewed.   Constitutional:       General: She is not in acute distress.     Appearance: She is not toxic-appearing.   HENT:      Head: Normocephalic and atraumatic.   Eyes:       Conjunctiva/sclera: Conjunctivae normal.   Pulmonary:      Effort: Pulmonary effort is normal.   Musculoskeletal:      Comments: NVI  Tenderness palpation of the first knuckle to middle interphalangeal joint of the second digit  Range of motion within normal limits   Skin:     Capillary Refill: Capillary refill takes less than 2 seconds.      Findings: No bruising, erythema or rash.   Neurological:      Mental Status: She is alert.   Psychiatric:         Mood and Affect: Mood normal.         Behavior: Behavior normal.

## 2025-02-08 NOTE — PATIENT INSTRUCTIONS
Patient Instructions   The final xray result will appear in your mychart; use the splint until you get the xray result, if the final xray shows a fracture, keep the splint on until you follow-up with orthopedics, if the xray shows no fracture, you can use the splint as needed; take off every 1-2 hours and can take it off to sleep and shower if there is no fracture.   Ice as needed.  Acetaminophen and/or ibuprofen for pain and inflammation.  Follow-up with orthopedics.  PCP follow-up in 3-5 days  Proceed to the ER if symptoms worsen.    If tests have been performed at [facility], our office will contact you with results if changes need to be made to the care plan discussed with you at the visit.  You can review your full results on [facility] MyChart.

## 2025-02-10 NOTE — PROGRESS NOTES
Orthopaedic Surgery - Office Note  Alyssa Kennedy (39 y.o. female)   : 1985   MRN: [id number]  Encounter Date: 2025    No chief complaint on file.        Assessment/Plan  Diagnoses and all orders for this visit:    Finger pain, left    Contusion of left index finger without damage to nail, subsequent encounter    Sprain of metacarpophalangeal (MCP) joint of left index finger, subsequent encounter    Injury of left hand, initial encounter  -     Ambulatory Referral to Orthopedic Surgery    The diagnosis as well as treatment options were reviewed with the patient in the office today.  I would recommend jayesh taping at all times the index and middle finger.  Skin maceration precautions were reviewed.  She was educated on reinjury causing a restart to the healing process.  She will need to jayesh tape at all times for 6 weeks followed by 2 weeks at night only.  If her symptoms persist after the period of jayesh taping she will schedule an appointment with hand surgery.  Her fingers were jayesh taped in the office today and she noted significant decrease in her symptoms with activities of daily living already.     Return if symptoms worsen or fail to improve.        History of Present Illness  This is a new patient who injured the left index finger approximately 6 weeks ago whenever her dog pulled on the leash and she slammed it against a sliding glass door.  She has been having difficulty with activities such as opening jars and brushing her daughter's hair since that time.  She went to urgent care on 2025 and had x-rays taken which were negative for fracture.  She was given a finger splint and presents today for evaluation and treatment.  She localizes the pain to the left index finger primarily at the MCP joint.  No paresthesias are reported.  She reports that it does not hurt her every day but when she bumps it or uses it it begins to hurt again.    Review of Systems  Pertinent items are noted in HPI.   "All other systems were reviewed and are negative.    Physical Exam  Ht 5' 2\" (1.575 m)   Wt 81.6 kg (180 lb)   LMP 01/18/2025   BMI 32.92 kg/m²   Cons: Appears well.  No apparent distress.  Psych: Alert. Oriented x3.  Mood and affect normal.    On examination patient's left index finger has no skin breakdown lesions or signs of infection.  She has full active and passive range of motion at the MCP PIP and DIP joint to flexion and extension without significant pain or weakness.  She has pain with stressing of the RCL and UCL at the MCP joint without gross instability.  She has no pain with stressing at the PIP and DIP joint.  Her capillary refill is normal.  She has no trigger fingers appreciated on clinical examination.  She is neurovascularly intact at the left index finger.   strength is 5 out of 5 with mild pain localized to the MCP joint.            Studies Reviewed  XR hand 3+ vw left  Order date: 2/8/2025  Authorizing: Saw Lazo PA-C  Ordered by Saw Lazo PA-C on 2/8/2025.  Narrative & Impression  XR HAND 3+ VW LEFT     INDICATION: S69.92XA: Unspecified injury of left wrist, hand and finger(s), initial encounter.     COMPARISON: None     For the purposes of institution wide universal language the following terms will apply: (thumb=1st digit/finger, index finger=2nd digit/finger, long finger=3rd digit/finger, ring=4th digit/finger and small finger=5th digit/finger)     FINDINGS:     No acute fracture or dislocation.     No significant degenerative changes.     No lytic or blastic osseous lesion.     Unremarkable soft tissues.     IMPRESSION:     No acute osseous abnormality.        Computerized Assisted Algorithm (CAA) may have been used to analyze all applicable images.        Workstation performed: [id number]  X-ray images as well as reports were reviewed by myself in the office today and I agree with radiologist interpretation.  Urgent care notes from 2/8/2025 were reviewed " for today's visit.    Procedures  No procedures today.    Medical, Surgical, Family, and Social History  The patient's medical history, family history, and social history, were reviewed and updated as appropriate.    Past Medical History:   Diagnosis Date    Urinary tract infection        Past Surgical History:   Procedure Laterality Date    ADENOIDECTOMY       SECTION      x2    TONSILLECTOMY         Family History   Problem Relation Age of Onset    Diabetes Mother     Depression Mother     Anxiety disorder Mother     Hearing loss Father     Depression Sister     No Known Problems Sister     No Known Problems Daughter     No Known Problems Daughter     No Known Problems Maternal Grandmother     No Known Problems Maternal Grandfather     Dementia Paternal Grandmother     Glaucoma Paternal Grandmother     Alcohol abuse Paternal Grandfather     Throat cancer Paternal Grandfather 80    Depression Maternal Aunt     No Known Problems Paternal Aunt        Social History     Occupational History    Not on file   Tobacco Use    Smoking status: Never    Smokeless tobacco: Never   Vaping Use    Vaping status: Never Used   Substance and Sexual Activity    Alcohol use: Never    Drug use: Never    Sexual activity: Yes     Partners: Male     Birth control/protection: Coitus interruptus, Condom Male, None       No Known Allergies      Current Outpatient Medications:     escitalopram (LEXAPRO) 10 mg tablet, Take 1 tablet (10 mg total) by mouth daily (Patient not taking: Reported on 2025), Disp: 90 tablet, Rfl: 1    hydrocortisone 2.5 % lotion, Apply topically 2 (two) times a day, Disp: 59 mL, Rfl: 0      Branden Sandoval PA-C

## 2025-02-11 ENCOUNTER — OFFICE VISIT (OUTPATIENT)
Dept: OBGYN CLINIC | Facility: CLINIC | Age: 40
End: 2025-02-11
Payer: COMMERCIAL

## 2025-02-11 VITALS — WEIGHT: 180 LBS | BODY MASS INDEX: 33.13 KG/M2 | HEIGHT: 62 IN

## 2025-02-11 DIAGNOSIS — S69.92XA INJURY OF LEFT HAND, INITIAL ENCOUNTER: ICD-10-CM

## 2025-02-11 DIAGNOSIS — S60.022D CONTUSION OF LEFT INDEX FINGER WITHOUT DAMAGE TO NAIL, SUBSEQUENT ENCOUNTER: ICD-10-CM

## 2025-02-11 DIAGNOSIS — S63.651D SPRAIN OF METACARPOPHALANGEAL (MCP) JOINT OF LEFT INDEX FINGER, SUBSEQUENT ENCOUNTER: ICD-10-CM

## 2025-02-11 DIAGNOSIS — M79.645 FINGER PAIN, LEFT: Primary | ICD-10-CM

## 2025-02-11 PROCEDURE — 99203 OFFICE O/P NEW LOW 30 MIN: CPT | Performed by: PHYSICIAN ASSISTANT

## 2025-03-25 ENCOUNTER — OFFICE VISIT (OUTPATIENT)
Dept: FAMILY MEDICINE CLINIC | Facility: HOSPITAL | Age: 40
End: 2025-03-25
Payer: COMMERCIAL

## 2025-03-25 VITALS
SYSTOLIC BLOOD PRESSURE: 100 MMHG | HEIGHT: 62 IN | HEART RATE: 81 BPM | WEIGHT: 194.4 LBS | BODY MASS INDEX: 35.77 KG/M2 | OXYGEN SATURATION: 98 % | DIASTOLIC BLOOD PRESSURE: 78 MMHG

## 2025-03-25 DIAGNOSIS — Z13.220 SCREENING FOR HYPERLIPIDEMIA: ICD-10-CM

## 2025-03-25 DIAGNOSIS — Z13.0 SCREENING FOR IRON DEFICIENCY ANEMIA: ICD-10-CM

## 2025-03-25 DIAGNOSIS — Z12.31 ENCOUNTER FOR SCREENING MAMMOGRAM FOR BREAST CANCER: ICD-10-CM

## 2025-03-25 DIAGNOSIS — Z13.29 SCREENING FOR THYROID DISORDER: ICD-10-CM

## 2025-03-25 DIAGNOSIS — Z13.1 SCREENING FOR DIABETES MELLITUS: ICD-10-CM

## 2025-03-25 DIAGNOSIS — Z00.00 ANNUAL PHYSICAL EXAM: Primary | ICD-10-CM

## 2025-03-25 PROCEDURE — 99396 PREV VISIT EST AGE 40-64: CPT | Performed by: STUDENT IN AN ORGANIZED HEALTH CARE EDUCATION/TRAINING PROGRAM

## 2025-03-25 NOTE — PROGRESS NOTES
Adult Annual Physical  Name: Alyssa Kennedy      : 1985      MRN: [id number]  Encounter Provider: Marcy Figueroa DO  Encounter Date: 3/25/2025   Encounter department: [facility] PRIMARY CARE [address]    Assessment & Plan  Annual physical exam  Topics as below.        Encounter for screening mammogram for breast cancer  Due & ordered.   Orders:  •  Mammo screening bilateral w 3d and cad; Future    Screening for iron deficiency anemia  Routine labs due & ordered.   Will r/o once completed.   Orders:  •  CBC and differential; Future    Screening for diabetes mellitus    Orders:  •  Comprehensive metabolic panel; Future    Screening for hyperlipidemia    Orders:  •  Lipid Panel with Direct LDL reflex; Future    Screening for thyroid disorder    Orders:  •  TSH, 3rd generation with Free T4 reflex; Future    Preventive Screenings:  - Diabetes Screening: orders placed  - Cholesterol Screening: orders placed   - Hepatitis C screening: screening up-to-date   - HIV screening: screening up-to-date   - Cervical cancer screening: risks/benefits discussed   - Breast cancer screening: screening up-to-date   - Colon cancer screening: screening not indicated   - Lung cancer screening: screening not indicated     Pt to call & schedule with [facility] -Dr. Herr      Immunizations:  - Immunizations due: Influenza  - The patient declines recommended vaccines currently despite my recommendations      Counseling/Anticipatory Guidance:  - Alcohol: discussed moderation in alcohol intake and recommendations for healthy alcohol use.   - Drug use: discussed harms of illicit drug use and how it can negatively impact mental/physical health.   - Tobacco use: discussed harms of tobacco use and management options for quitting.   - Dental health: discussed importance of regular tooth brushing, flossing, and dental visits.   - Sexual health: discussed sexually transmitted diseases, partner selection, use of condoms, avoidance  of unintended pregnancy, and contraceptive alternatives.   - Diet: discussed recommendations for a healthy/well-balanced diet.   - Exercise: the importance of regular exercise/physical activity was discussed. Recommend exercise 3-5 times per week for at least 30 minutes.   - Injury prevention: discussed safety/seat belts, safety helmets, smoke detectors, carbon monoxide detectors, and smoking near bedding or upholstery.       Depression Screening and Follow-up Plan: Patient was screened for depression during today's encounter. They screened negative with a PHQ-2 score of 0.          History of Present Illness     Adult Annual Physical:  Patient presents for annual physical.     Diet and Physical Activity:  - Diet/Nutrition: no special diet. good water intake, some cheese, not much milk  - Exercise: walking and 1-2 times a week on average.    Depression Screening:  - PHQ-2 Score: 0    General Health:  - Sleep: 4-6 hours of sleep on average. sometimes wakes interrupted, and can't go back to sleep. 2 yo in bed with them sometimes too. not waking to urinate.  - Hearing: normal hearing bilateral ears.  - Vision: no vision problems.  - Dental: regular dental visits and brushes teeth twice daily.    /GYN Health:  - Follows with GYN: yes.   - Menopause: premenopausal.   - Last menstrual cycle: 3/8/2025.   - History of STDs: no  - Contraception: barrier methods. one male partner      Advanced Care Planning:  - Has an advanced directive?: no    - Has a durable medical POA?: no      Wt Readings from Last 3 Encounters:   03/25/25 88.2 kg (194 lb 6.4 oz)   02/11/25 81.6 kg (180 lb)   03/08/24 81.6 kg (180 lb)     Temp Readings from Last 3 Encounters:   02/08/25 97.9 °F (36.6 °C)   06/27/23 99.5 °F (37.5 °C)   12/30/22 98.9 °F (37.2 °C)     BP Readings from Last 3 Encounters:   03/25/25 100/78   02/08/25 119/64   07/11/23 118/76     Pulse Readings from Last 3 Encounters:   03/25/25 81   02/08/25 71   07/11/23 79     Review of  "Systems   Constitutional:  Negative for chills and fever.   HENT:  Positive for rhinorrhea (cold a few weeks).    Respiratory:  Negative for cough and shortness of breath.    Cardiovascular:  Negative for chest pain and palpitations.   Gastrointestinal:  Negative for constipation and diarrhea.   Genitourinary:  Negative for dysuria and frequency.   Musculoskeletal:  Positive for arthralgias (hand pain on L  from injury - seeing ortho).   Neurological:  Negative for dizziness, light-headedness and headaches.     Current Outpatient Medications on File Prior to Visit   Medication Sig Dispense Refill   • [DISCONTINUED] escitalopram (LEXAPRO) 10 mg tablet Take 1 tablet (10 mg total) by mouth daily (Patient not taking: Reported on 2/8/2025) 90 tablet 1   • [DISCONTINUED] hydrocortisone 2.5 % lotion Apply topically 2 (two) times a day 59 mL 0     No current facility-administered medications on file prior to visit.      Social History     Tobacco Use   • Smoking status: Never   • Smokeless tobacco: Never   Vaping Use   • Vaping status: Never Used   Substance and Sexual Activity   • Alcohol use: Never   • Drug use: Never   • Sexual activity: Yes     Partners: Male     Birth control/protection: Coitus interruptus, Condom Male, None     Objective   /78   Pulse 81   Ht 5' 2\" (1.575 m)   Wt 88.2 kg (194 lb 6.4 oz)   LMP 03/08/2025   SpO2 98%   BMI 35.56 kg/m²     Physical Exam  Vitals and nursing note reviewed.   Constitutional:       General: She is not in acute distress.     Appearance: Normal appearance. She is well-developed. She is obese. She is not ill-appearing.   HENT:      Head: Normocephalic and atraumatic.      Right Ear: Tympanic membrane, ear canal and external ear normal. There is no impacted cerumen.      Left Ear: Tympanic membrane, ear canal and external ear normal. There is no impacted cerumen.      Nose: Nose normal. No congestion.      Mouth/Throat:      Mouth: Mucous membranes are moist.      " Pharynx: Oropharynx is clear. No oropharyngeal exudate.   Eyes:      General: No scleral icterus.        Right eye: No discharge.         Left eye: No discharge.      Conjunctiva/sclera: Conjunctivae normal.   Cardiovascular:      Rate and Rhythm: Normal rate and regular rhythm.      Pulses: Normal pulses.      Heart sounds: Normal heart sounds. No murmur heard.  Pulmonary:      Effort: Pulmonary effort is normal. No respiratory distress.      Breath sounds: Normal breath sounds.   Abdominal:      Tenderness: There is no right CVA tenderness or left CVA tenderness.   Musculoskeletal:      Cervical back: Normal range of motion and neck supple. No rigidity or tenderness.      Right lower leg: No edema.      Left lower leg: No edema.   Lymphadenopathy:      Cervical: No cervical adenopathy.   Skin:     General: Skin is warm and dry.      Capillary Refill: Capillary refill takes less than 2 seconds.   Neurological:      Mental Status: She is alert and oriented to person, place, and time.      Gait: Gait normal.   Psychiatric:         Mood and Affect: Mood normal.         Behavior: Behavior normal.         Thought Content: Thought content normal.         Judgment: Judgment normal.

## 2025-04-15 ENCOUNTER — OFFICE VISIT (OUTPATIENT)
Dept: OBGYN CLINIC | Facility: CLINIC | Age: 40
End: 2025-04-15
Payer: COMMERCIAL

## 2025-04-15 VITALS — HEIGHT: 62 IN | WEIGHT: 194 LBS | BODY MASS INDEX: 35.7 KG/M2

## 2025-04-15 DIAGNOSIS — M65.322 TRIGGER FINGER, LEFT INDEX FINGER: Primary | ICD-10-CM

## 2025-04-15 PROCEDURE — 20550 NJX 1 TENDON SHEATH/LIGAMENT: CPT | Performed by: ORTHOPAEDIC SURGERY

## 2025-04-15 PROCEDURE — 99213 OFFICE O/P EST LOW 20 MIN: CPT | Performed by: ORTHOPAEDIC SURGERY

## 2025-04-15 RX ORDER — BETAMETHASONE SODIUM PHOSPHATE AND BETAMETHASONE ACETATE 3; 3 MG/ML; MG/ML
6 INJECTION, SUSPENSION INTRA-ARTICULAR; INTRALESIONAL; INTRAMUSCULAR; SOFT TISSUE
Status: COMPLETED | OUTPATIENT
Start: 2025-04-15 | End: 2025-04-15

## 2025-04-15 RX ORDER — LIDOCAINE HYDROCHLORIDE 10 MG/ML
1 INJECTION, SOLUTION INFILTRATION; PERINEURAL
Status: COMPLETED | OUTPATIENT
Start: 2025-04-15 | End: 2025-04-15

## 2025-04-15 RX ADMIN — LIDOCAINE HYDROCHLORIDE 1 ML: 10 INJECTION, SOLUTION INFILTRATION; PERINEURAL at 09:00

## 2025-04-15 RX ADMIN — BETAMETHASONE SODIUM PHOSPHATE AND BETAMETHASONE ACETATE 6 MG: 3; 3 INJECTION, SUSPENSION INTRA-ARTICULAR; INTRALESIONAL; INTRAMUSCULAR; SOFT TISSUE at 09:00

## 2025-04-15 NOTE — PROGRESS NOTES
"    ORTHOPAEDIC HAND, WRIST, AND ELBOW OFFICE  VISIT     Name: Alyssa Kennedy      : 1985      MRN: [id number]  Encounter Provider: Kailey Palma MD  Encounter Date: 4/15/2025   Encounter department: [facility]  :  Assessment & Plan  Trigger finger, left index finger  Assessment:  38yo Female Left index trigger finger   Subjective history, clinical exam, and diagnostic studies reviewed with patient  Diagnosis discussed  Treatment options discussed which include nonoperative and operative management.  We discussed use of splinting, therapy, activity modification, use of NSAIDs (oral and topical), and injections.  We discussed risks and benefits of each as well as expected reasonable outcomes.  Surgery can be considered following unsuccessful treatment with nonoperative management.   I recommended CSI.    Symptoms may recur following CSI, especially in patients who present with longstanding symptoms and patient who have diabetes.  May have repeat CSI after 8 weeks.  If symptoms recur after 2 CSI's, patient will be offered surgery in the form of trigger finger release or the option of a 3rd CSI.  Patient with diabetes are counseled to monitor the blood sugars following the injection.    The patient was given the opportunity to ask questions.  Questions were answered to the patient's satisfaction.  The patient decided to move forward with CSI via shared decision making.  Follow up in 8 weeks for clinical evaluation. If asymptomatic, may cancel appointment           General Discussions:     Trigger FInger: The anatomy and physiology of trigger finger was discussed with the patient today in the office.  Edema and increased contact pressure within the flexor tendons at the A1 pulley can cause pain, crepitation, and limitation of function.  Treatment options include resting MP blocking splints, PIP blocking splints, or \"band-aid\" splints to decrease edema, oral " anti-inflammatory medications, home or formal therapy exercises, up to 2 steroid injections, or surgical release.  While a majority of patients do respond to conservative treatment, up to 20% may require surgical release.     Operative Discussions:   None    History of Present Illness   HPI  Chief Complaint   Patient presents with    Left Index Finger - New Patient Visit, Pain     Pain when closing fist       Alyssa Kennedy is a 39 y.o. female who presents to the office with Left index finger pain that is concentrated to the MCP and PIP joint. Patient reports that around [place] time, she went to take her dogs for a walk, while holding the leash in her left hand the dog ran out the door causing her hand to impact the metal door frame.  Patient states that she had onset of PIP and MCP joint pain.  Patient reports that she thought would go away on its own but after it not resolving after few months she went and saw Branden Sandoval PA-C.  Branden performed x-ray imaging that was negative for fracture and he recommended jayesh taping.  Patient reports that she has been jayesh taping the fingers religiously with minimal improvement.  Patient reports that that when the fingers are taped she does have soreness when trying to perform range of motion.  Patient reports that the soreness becomes more intense when the fingers are not taped.  Patient denies her fingers locking up.  She denies any numbness or tingling.    Hand dominance: RHD    REVIEW OF SYSTEMS:  General: no fever, no chills  HEENT:  No loss of hearing or eyesight problems  Eyes:  No red eyes  Respiratory:  No coughing, shortness of breath or wheezing  Cardiovascular:  No chest pain, no palpitations  GI:  Abdomen soft nontender, denies nausea  Endocrine:  No muscle weakness, no frequent urination, no excessive thirst  Urinary:  No dysuria, no incontinence  Musculoskeletal: see HPI and PE  SKIN:  No skin rash, no dry skin  Neurological:  No headaches, no  "confusion  Psychiatric:  No suicide thoughts, no anxiety, no depression  Review of all other systems is negative    Objective   Ht 5' 2\" (1.575 m)   Wt 88 kg (194 lb)   LMP 03/08/2025   BMI 35.48 kg/m²      General: well developed and well nourished, alert, oriented times 3, and appears comfortable  Psychiatric: Normal  HEENT: Trachea Midline, No torticollis  Cardiovascular: No discernable arrhythmia  Pulmonary: No wheezing or stridor  Abdomen: No rebound or guarding  Extremities: No peripheral edema  Skin: No masses, erythema, lacerations, fluctation, ulcerations  Neurovascular: Sensation Intact to the Median, Ulnar, Radial Nerve, Motor Intact to the Median, Ulnar, Radial Nerve, and Pulses Intact    Musculoskeletal exam:  LEFT SIDE:  Finger:  Full ROM, Tenderness to the index PIP and MCP joint, and crepitus with index finger passive flexion/extension      STUDIES REVIEWED:  Images were reviewed in PACS and demonstrate: no fracture or osseous abnormality      PROCEDURES PERFORMED:  Hand/upper extremity injection: L index A1  Universal Protocol:  Timeout called at: 4/15/2025 9:25 AM.  Patient understanding: patient states understanding of the procedure being performed  Patient identity confirmed: verbally with patient  Supporting Documentation  Indications: pain   Procedure Details  Condition:trigger finger Location: index finger - L index A1   Needle size: 25 G  Ultrasound guidance: no  Approach: volar  Medications administered: 1 mL lidocaine 1 %; 6 mg betamethasone acetate-betamethasone sodium phosphate 6 (3-3) mg/mL  Patient tolerance: patient tolerated the procedure well with no immediate complications  Dressing:  Sterile dressing applied         -       "

## 2025-06-11 ENCOUNTER — OFFICE VISIT (OUTPATIENT)
Dept: OBGYN CLINIC | Facility: CLINIC | Age: 40
End: 2025-06-11
Payer: COMMERCIAL

## 2025-06-11 VITALS — WEIGHT: 193 LBS | BODY MASS INDEX: 35.51 KG/M2 | HEIGHT: 62 IN

## 2025-06-11 DIAGNOSIS — M65.322 TRIGGER FINGER, LEFT INDEX FINGER: Primary | ICD-10-CM

## 2025-06-11 PROCEDURE — 20550 NJX 1 TENDON SHEATH/LIGAMENT: CPT | Performed by: ORTHOPAEDIC SURGERY

## 2025-06-11 PROCEDURE — 99213 OFFICE O/P EST LOW 20 MIN: CPT | Performed by: ORTHOPAEDIC SURGERY

## 2025-06-11 RX ORDER — LIDOCAINE HYDROCHLORIDE 10 MG/ML
1 INJECTION, SOLUTION INFILTRATION; PERINEURAL
Status: COMPLETED | OUTPATIENT
Start: 2025-06-11 | End: 2025-06-11

## 2025-06-11 RX ORDER — BETAMETHASONE SODIUM PHOSPHATE AND BETAMETHASONE ACETATE 3; 3 MG/ML; MG/ML
6 INJECTION, SUSPENSION INTRA-ARTICULAR; INTRALESIONAL; INTRAMUSCULAR; SOFT TISSUE
Status: COMPLETED | OUTPATIENT
Start: 2025-06-11 | End: 2025-06-11

## 2025-06-11 RX ADMIN — LIDOCAINE HYDROCHLORIDE 1 ML: 10 INJECTION, SOLUTION INFILTRATION; PERINEURAL at 09:15

## 2025-06-11 RX ADMIN — BETAMETHASONE SODIUM PHOSPHATE AND BETAMETHASONE ACETATE 6 MG: 3; 3 INJECTION, SUSPENSION INTRA-ARTICULAR; INTRALESIONAL; INTRAMUSCULAR; SOFT TISSUE at 09:15

## 2025-06-11 NOTE — ASSESSMENT & PLAN NOTE
Orders:    Hand/upper extremity injection: L index A1  Subjective history, clinical exam, and diagnostic studies reviewed with patient  Diagnosis discussed  Treatment options discussed which include nonoperative and operative management.  We discussed use of splinting, therapy, activity modification, use of NSAIDs (oral and topical), and injections.  We discussed risks and benefits of each as well as expected reasonable outcomes.  Surgery can be considered following unsuccessful treatment with nonoperative management.   I recommended CSI.    Symptoms may recur following CSI, especially in patients who present with longstanding symptoms and patient who have diabetes.  May have repeat CSI after 8 weeks.  If symptoms recur after 2 CSI's, patient will be offered surgery in the form of trigger finger release or the option of a 3rd CSI.  Patient with diabetes are counseled to monitor the blood sugars following the injection.    The patient was given the opportunity to ask questions.  Questions were answered to the patient's satisfaction.  The patient decided to move forward with CSI via shared decision making.  Follow up in 8 weeks for clinical evaluation. If asymptomatic, may cancel appointment

## 2025-06-11 NOTE — PROGRESS NOTES
"    ORTHOPAEDIC HAND, WRIST, AND ELBOW OFFICE  VISIT     Name: Alyssa Kennedy      : 1985      MRN: [id number]  Encounter Provider: Kailey Palma MD  Encounter Date: 2025   Encounter department: [facility]  :  Assessment & Plan  Trigger finger, left index finger    Orders:    Hand/upper extremity injection: L index A1  Subjective history, clinical exam, and diagnostic studies reviewed with patient  Diagnosis discussed  Treatment options discussed which include nonoperative and operative management.  We discussed use of splinting, therapy, activity modification, use of NSAIDs (oral and topical), and injections.  We discussed risks and benefits of each as well as expected reasonable outcomes.  Surgery can be considered following unsuccessful treatment with nonoperative management.   I recommended CSI.    Symptoms may recur following CSI, especially in patients who present with longstanding symptoms and patient who have diabetes.  May have repeat CSI after 8 weeks.  If symptoms recur after 2 CSI's, patient will be offered surgery in the form of trigger finger release or the option of a 3rd CSI.  Patient with diabetes are counseled to monitor the blood sugars following the injection.    The patient was given the opportunity to ask questions.  Questions were answered to the patient's satisfaction.  The patient decided to move forward with CSI via shared decision making.  Follow up in 8 weeks for clinical evaluation. If asymptomatic, may cancel appointment       General Discussions:     Trigger FInger: The anatomy and physiology of trigger finger was discussed with the patient today in the office.  Edema and increased contact pressure within the flexor tendons at the A1 pulley can cause pain, crepitation, and limitation of function.  Treatment options include resting MP blocking splints, PIP blocking splints, or \"band-aid\" splints to decrease edema, oral " "anti-inflammatory medications, home or formal therapy exercises, up to 2 steroid injections, or surgical release.  While a majority of patients do respond to conservative treatment, up to 20% may require surgical release.       History of Present Illness   HPI  Chief Complaint   Patient presents with    Left Index Finger - Pain, Follow-up     Pain when making a fist       Alyssa Kennedy is a 40 y.o. female who presents for follow up left index trigger finger. Previously seen on 4/15/25 where she received a CSI. Pain has improved with CSI. No reports of finger locking.     Hand dominance: RHD    REVIEW OF SYSTEMS:  General: no fever, no chills  HEENT:  No loss of hearing or eyesight problems  Eyes:  No red eyes  Respiratory:  No coughing, shortness of breath or wheezing  Cardiovascular:  No chest pain, no palpitations  GI:  Abdomen soft nontender, denies nausea  Endocrine:  No muscle weakness, no frequent urination, no excessive thirst  Urinary:  No dysuria, no incontinence  Musculoskeletal: see HPI and PE  SKIN:  No skin rash, no dry skin  Neurological:  No headaches, no confusion  Psychiatric:  No suicide thoughts, no anxiety, no depression  Review of all other systems is negative    Objective   Ht 5' 2\" (1.575 m)   Wt 87.5 kg (193 lb)   BMI 35.30 kg/m²      General: well developed and well nourished, alert, oriented times 3, and appears comfortable  Psychiatric: Normal  HEENT: Trachea Midline, No torticollis  Cardiovascular: No discernable arrhythmia  Pulmonary: No wheezing or stridor  Abdomen: No rebound or guarding  Extremities: No peripheral edema  Skin: No masses, erythema, lacerations, fluctation, ulcerations  Neurovascular: Sensation Intact to the Median, Ulnar, Radial Nerve, Motor Intact to the Median, Ulnar, Radial Nerve, and Pulses Intact    Musculoskeletal exam:  LEFT SIDE:  Index Finger:  Full ROM, Tenderness at level of A1 pulley and crepitus with index finger passive flexion/extension. No locking or " catching observed      STUDIES REVIEWED:  No Studies to review      PROCEDURES PERFORMED:  Hand/upper extremity injection: L index A1    Universal Protocol:  Consent: Verbal consent obtained  Risks and benefits: risks, benefits and alternatives were discussed  Consent given by: patient  Timeout called at: 6/11/2025 9:14 AM.  Patient understanding: patient states understanding of the procedure being performed  Patient consent: the patient's understanding of the procedure matches consent given  Patient identity confirmed: verbally with patient  Supporting Documentation  Indications: pain   Procedure Details  Condition:trigger finger Location: index finger - L index A1   Needle size: 25 G  Approach: volar  Medications administered: 1 mL lidocaine 1 %; 6 mg betamethasone acetate-betamethasone sodium phosphate 6 (3-3) mg/mL  Patient tolerance: patient tolerated the procedure well with no immediate complications  Dressing:  Sterile dressing applied         -     Scribe Attestation      I,:  Joslyn Hope am acting as a scribe while in the presence of the attending physician.:       I,:  Kailey Palma MD personally performed the services described in this documentation    as scribed in my presence.:

## 2025-06-26 ENCOUNTER — HOSPITAL ENCOUNTER (OUTPATIENT)
Dept: MAMMOGRAPHY | Facility: IMAGING CENTER | Age: 40
Discharge: HOME | End: 2025-06-26
Payer: COMMERCIAL

## 2025-06-26 VITALS — BODY MASS INDEX: 34.04 KG/M2 | HEIGHT: 62 IN | WEIGHT: 185 LBS

## 2025-06-26 DIAGNOSIS — Z12.31 ENCOUNTER FOR SCREENING MAMMOGRAM FOR BREAST CANCER: ICD-10-CM

## 2025-06-26 PROCEDURE — 77063 BREAST TOMOSYNTHESIS BI: CPT

## 2025-06-26 PROCEDURE — 77067 SCR MAMMO BI INCL CAD: CPT
